# Patient Record
Sex: FEMALE | Race: WHITE | HISPANIC OR LATINO | Employment: FULL TIME | URBAN - METROPOLITAN AREA
[De-identification: names, ages, dates, MRNs, and addresses within clinical notes are randomized per-mention and may not be internally consistent; named-entity substitution may affect disease eponyms.]

---

## 2017-01-09 ENCOUNTER — ALLSCRIPTS OFFICE VISIT (OUTPATIENT)
Dept: OTHER | Facility: OTHER | Age: 42
End: 2017-01-09

## 2017-03-06 ENCOUNTER — ALLSCRIPTS OFFICE VISIT (OUTPATIENT)
Dept: OTHER | Facility: OTHER | Age: 42
End: 2017-03-06

## 2017-08-28 ENCOUNTER — ALLSCRIPTS OFFICE VISIT (OUTPATIENT)
Dept: OTHER | Facility: OTHER | Age: 42
End: 2017-08-28

## 2018-01-10 PROCEDURE — G0145 SCR C/V CYTO,THINLAYER,RESCR: HCPCS | Performed by: OBSTETRICS & GYNECOLOGY

## 2018-01-10 PROCEDURE — 87624 HPV HI-RISK TYP POOLED RSLT: CPT | Performed by: OBSTETRICS & GYNECOLOGY

## 2018-01-10 NOTE — RESULT NOTES
Message   1  No active pulmonary disease  2   No evidence of rib fractures  Verified Results  * XR RIBS LEFT W PA CHEST MIN 3 VIEWS [id number] 02:31PM [place] Willem Order Number: [id number]     Test Name Result Flag Reference   XR RIBS LEFT W PA CHEST MIN 3 VIEWS (Report)     LEFT RIBS AND CHEST -DUAL ENERGY     INDICATION: Left mid anterior rib pain, patient fell 1 week ago  COMPARISON: 10/8/2004     VIEWS: PA chest (including soft tissue/bone algorithms) and 3 views left hemithorax; 6 images     FINDINGS:     The cardiomediastinal silhouette is unremarkable  Lungs are clear  No pleural effusions  There is no pneumothorax  No rib fractures are identified  IMPRESSION:     1  No active pulmonary disease  2  No evidence of rib fractures         Workstation performed: [id number]     Signed by:   Cesario Nick DO   2/16/16

## 2018-01-11 ENCOUNTER — LAB REQUISITION (OUTPATIENT)
Dept: LAB | Facility: HOSPITAL | Age: 43
End: 2018-01-11
Payer: COMMERCIAL

## 2018-01-11 DIAGNOSIS — Z11.51 ENCOUNTER FOR SCREENING FOR HUMAN PAPILLOMAVIRUS (HPV): ICD-10-CM

## 2018-01-11 DIAGNOSIS — Z12.72 ENCOUNTER FOR SCREENING FOR MALIGNANT NEOPLASM OF VAGINA: ICD-10-CM

## 2018-01-12 VITALS
SYSTOLIC BLOOD PRESSURE: 124 MMHG | HEIGHT: 68 IN | WEIGHT: 177.13 LBS | BODY MASS INDEX: 26.84 KG/M2 | DIASTOLIC BLOOD PRESSURE: 82 MMHG

## 2018-01-12 VITALS
RESPIRATION RATE: 14 BRPM | BODY MASS INDEX: 26.36 KG/M2 | SYSTOLIC BLOOD PRESSURE: 116 MMHG | WEIGHT: 178 LBS | HEART RATE: 80 BPM | HEIGHT: 69 IN | DIASTOLIC BLOOD PRESSURE: 74 MMHG

## 2018-01-13 NOTE — RESULT NOTES
Message   all labs wnl  Verified Results  (1) CBC/PLT/DIFF [id number] 09:15AM Lucio Marie Order Number: [id number]     Test Name Result Flag Reference   WBC COUNT 6 37 Thousand/uL  4 31-10 16   RBC COUNT 4 56 Million/uL  3 81-5 12   HEMOGLOBIN 14 1 g/dL  11 5-15 4   HEMATOCRIT 40 5 %  34 8-46  1   MCV 89 fL  82-98   MCH 30 9 pg  26 8-34 3   MCHC 34 8 g/dL  31 4-37 4   RDW 13 5 %  11 6-15 1   MPV 10 4 fL  8 9-12 7   PLATELET COUNT 486 Thousands/uL  149-390   nRBC AUTOMATED 0 /100 WBCs     NEUTROPHILS RELATIVE PERCENT 62 %  43-75   LYMPHOCYTES RELATIVE PERCENT 28 %  14-44   MONOCYTES RELATIVE PERCENT 7 %  4-12   EOSINOPHILS RELATIVE PERCENT 2 %  0-6   BASOPHILS RELATIVE PERCENT 1 %  0-1   NEUTROPHILS ABSOLUTE COUNT 3 96 Thousands/?L  1 85-7 62   LYMPHOCYTES ABSOLUTE COUNT 1 79 Thousands/?L  0 60-4 47   MONOCYTES ABSOLUTE COUNT 0 43 Thousand/?L  0 17-1 22   EOSINOPHILS ABSOLUTE COUNT 0 12 Thousand/?L  0 00-0 61   BASOPHILS ABSOLUTE COUNT 0 05 Thousands/?L  0 00-0 10   - Patient Instructions: This bloodwork is non-fasting  Please drink two glasses of water morning of bloodwork  - Patient Instructions: This bloodwork is non-fasting  Please drink two glasses of water morning of bloodwork  (1) COMPREHENSIVE METABOLIC PANEL [id number] 79:70KC Acosta MAO Order Number: [id number]     Test Name Result Flag Reference   GLUCOSE,RANDM 86 mg/dL     If the patient is fasting, the ADA then defines impaired fasting glucose as > 100 mg/dL and diabetes as > or equal to 123 mg/dL     SODIUM 140 mmol/L  136-145   POTASSIUM 3 9 mmol/L  3 5-5 3   CHLORIDE 107 mmol/L  100-108   CARBON DIOXIDE 26 mmol/L  21-32   ANION GAP (CALC) 7 mmol/L  4-13   BLOOD UREA NITROGEN 8 mg/dL  5-25   CREATININE 0 74 mg/dL  0 60-1 30   Standardized to IDMS reference method   CALCIUM 8 4 mg/dL  8 3-10 1   BILI, TOTAL 0 62 mg/dL  0 20-1 00   ALK PHOSPHATAS 68 U/L     ALT (SGPT) 16 U/L  12-78 AST(SGOT) 7 U/L  5-45   ALBUMIN 3 9 g/dL  3 5-5 0   TOTAL PROTEIN 7 2 g/dL  6 4-8 2   eGFR Non-African American      >60 0 ml/min/1 73sq m   - Patient Instructions: This bloodwork is non-fasting  Please drink two glasses of water morning of bloodwork  National Kidney Disease Education Program recommendations are as follows:  GFR calculation is accurate only with a steady state creatinine  Chronic Kidney disease less than 60 ml/min/1 73 sq  meters  Kidney failure less than 15 ml/min/1 73 sq  meters  (1) T4, FREE [id number] 09:15AM Francisca Green   TW Order Number: [id number]     Test Name Result Flag Reference   T4,FREE 0 99 ng/dL  0 76-1 46   - Patient Instructions: This bloodwork is non-fasting  Please drink two glasses of water morning of bloodwork  (1) TSH [id number] 09:15AM Francisca Green   TW Order Number: [id number]     Test Name Result Flag Reference   TSH 2 310 uIU/mL  0 358-3 740   - Patient Instructions: This bloodwork is non-fasting  Please drink two glasses of water morning of bloodwork  - Patient Instructions: This bloodwork is non-fasting  Please drink two glasses of water morning of bloodwork  Patients undergoing fluorescein dye angiography may retain small amounts of fluorescein in the body for 48-72 hours post procedure  Samples containing fluorescein can produce falsely depressed TSH values  If the patient had this procedure,a specimen should be resubmitted post fluorescein clearance  The recommended reference ranges for TSH during pregnancy are as follows:  First trimester 0 1 to 2 5 uIU/mL  Second trimester  0 2 to 3 0 uIU/mL  Third trimester 0 3 to 3 0 uIU/m     (1) VITAMIN B12 [id number] 09:15AM Francisca Green   TW Order Number: [id number]     Test Name Result Flag Reference   VITAMIN B12 604 pg/mL  100-900   - Patient Instructions: This bloodwork is non-fasting  Please drink two glasses of water morning of bloodwork       (1) FOLATE [id number] 09:15AM [facility] Order Number: [id number]     Test Name Result Flag Reference   FOLATE 10 1 ng/mL  3 1-17 5   - Patient Instructions: This bloodwork is non-fasting  Please drink two glasses of water morning of bloodwork  (1) FERRITIN [id number] 09:15AM [facility] Order Number: [id number]     Test Name Result Flag Reference   FERRITIN 27 ng/mL  8-388   - Patient Instructions: This bloodwork is non-fasting  Please drink two glasses of water morning of bloodwork  (1) IRON [id number] 09:15AM [facility] Order Number: [id number]     Test Name Result Flag Reference   IRON 136 ug/dL     Patients treated with metal-binding drugs (ie  Deferoxamine) may have depressed iron values  - Patient Instructions: This bloodwork is non-fasting  Please drink two glasses of water morning of bloodwork

## 2018-01-14 VITALS
DIASTOLIC BLOOD PRESSURE: 70 MMHG | BODY MASS INDEX: 25.96 KG/M2 | WEIGHT: 175.25 LBS | SYSTOLIC BLOOD PRESSURE: 100 MMHG | HEIGHT: 69 IN

## 2018-01-15 LAB
HPV RRNA GENITAL QL NAA+PROBE: NORMAL
LAB AP GYN PRIMARY INTERPRETATION: NORMAL
Lab: NORMAL

## 2018-01-17 NOTE — RESULT NOTES
Message   low vitamin D, rec  vitamin D3 3000 IU daily  Recheck vitamin D 25-OH yearly  Verified Results  (1) COMPREHENSIVE METABOLIC PANEL [id number] 72:62KA Duane Bihari    Order Number: [id number]      National Kidney Disease Education Program recommendations are as follows:  GFR calculation is accurate only with a steady state creatinine  Chronic Kidney disease less than 60 ml/min/1 73 sq  meters  Kidney failure less than 15 ml/min/1 73 sq  meters  Test Name Result Flag Reference   GLUCOSE,RANDM 84 mg/dL     If the patient is fasting, the ADA then defines impaired fasting glucose as > 100 mg/dL and diabetes as > or equal to 123 mg/dL  SODIUM 139 mmol/L  136-145   POTASSIUM 4 3 mmol/L  3 5-5 3   CHLORIDE 106 mmol/L  100-108   CARBON DIOXIDE 28 mmol/L  21-32   ANION GAP (CALC) 5 mmol/L  4-13   BLOOD UREA NITROGEN 9 mg/dL  5-25   CREATININE 0 81 mg/dL  0 60-1 30   Standardized to IDMS reference method   CALCIUM 8 5 mg/dL  8 3-10 1   BILI, TOTAL 0 61 mg/dL  0 20-1 00   ALK PHOSPHATAS 84 U/L     ALT (SGPT) 15 U/L  12-78   AST(SGOT) 9 U/L  5-45   ALBUMIN 3 7 g/dL  3 5-5 0   TOTAL PROTEIN 6 8 g/dL  6 4-8 2   eGFR Non-African American      >60 0 ml/min/1 73sq m     (1) CBC/PLT/DIFF [id number] 08:25AM Duanereyna Hajializa    Order Number: [id number]     Order Number: [id number]     Test Name Result Flag Reference   WBC COUNT 6 86 Thousand/uL  4 31-10 16   RBC COUNT 4 85 Million/uL  3 81-5 12   HEMOGLOBIN 14 8 g/dL  11 5-15 4   HEMATOCRIT 43 0 %  34 8-46  1   MCV 89 fL  82-98   MCH 30 5 pg  26 8-34 3   MCHC 34 4 g/dL  31 4-37 4   RDW 13 5 %  11 6-15 1   MPV 10 9 fL  8 9-12 7   PLATELET COUNT 394 Thousands/uL  149-390   nRBC AUTOMATED 0 /100 WBCs     NEUTROPHILS RELATIVE PERCENT 55 %  43-75   LYMPHOCYTES RELATIVE PERCENT 34 %  14-44   MONOCYTES RELATIVE PERCENT 8 %  4-12   EOSINOPHILS RELATIVE PERCENT 2 %  0-6   BASOPHILS RELATIVE PERCENT 1 %  0-1   NEUTROPHILS ABSOLUTE COUNT 3 79 Thousands/µL  1 85-7 62   LYMPHOCYTES ABSOLUTE COUNT 2 31 Thousands/µL  0 60-4 47   MONOCYTES ABSOLUTE COUNT 0 56 Thousand/µL  0 17-1 22   EOSINOPHILS ABSOLUTE COUNT 0 15 Thousand/µL  0 00-0 61   BASOPHILS ABSOLUTE COUNT 0 04 Thousands/µL  0 00-0 10     (1) HEMOGLOBIN A1C [id number] 08:25WINTER Kei Henley   TW Order Number: [id number]      5 7-6 4% impaired fasting glucose  >=6 5% diagnosis of diabetes    Falsely low levels are seen in conditions linked to short RBC life span-  hemolytic anemia, and splenomegaly  Falsely elevated levels are seen in situations where there is an increased production of RBC- receipt of erythropoietin or blood transfusions  Adopted from ADA-Clinical Practice Recommendations     Test Name Result Flag Reference   HEMOGLOBIN A1C 4 9 %  4 0-5 6   EST  AVG  GLUCOSE 94 mg/dl       (1) LIPID PANEL FASTING W DIRECT LDL REFLEX [id number] 08:25WINTER Kei Henley   TW Order Number: [id number]      Triglyceride:         Normal              <150 mg/dl       Borderline High    150-199 mg/dl       High               200-499 mg/dl       Very High          >499 mg/dl  Cholesterol:         Desirable        <200 mg/dl      Borderline High  200-239 mg/dl      High             >239 mg/dl  HDL Cholesterol:        High    >59 mg/dL      Low     <41 mg/dL  LDL Cholesterol:        Optimal          <100 mg/dl         Near Optimal     100-129 mg/dl        Above Optimal          Borderline High   130-159 mg/dl          High              160-189 mg/dl          Very High        >189 mg/dl  LDL CALCULATED:    This screening LDL is a calculated result  It does not have the accuracy of the Direct Measured LDL in the monitoring of patients with hyperlipidemia and/or statin therapy  Direct Measure LDL (STH081) must be ordered separately in these patients       Test Name Result Flag Reference   CHOLESTEROL 147 mg/dL     LDL CHOLESTEROL CALCULATED 65 mg/dL  0-100   TRIGLYCERIDES 68 mg/dL  <=150   Specimen collection should occur prior to N-Acetylcysteine or Metamizole administration due to the potential for falsely depressed results  HDL,DIRECT 68 mg/dL H 40-60     (1) T4, FREE [id number] 08:25AM Scaffoldmie Cami   TW Order Number: [id number]     Test Name Result Flag Reference   T4,FREE 0 81 ng/dL  0 76-1 46     (1) TSH [id number] 08:25AM [facility]   TW Order Number: [id number]    Patients undergoing fluorescein dye angiography may retain small amounts of fluorescein in the body for 48-72 hours post procedure  Samples containing fluorescein can produce falsely depressed TSH values  If the patient had this procedure,a specimen should be resubmitted post fluorescein clearance          The recommended reference ranges for TSH during pregnancy are as follows:  First trimester 0 1 to 2 5 uIU/mL  Second trimester  0 2 to 3 0 uIU/mL  Third trimester 0 3 to 3 0 uIU/m     Test Name Result Flag Reference   TSH 2 550 uIU/mL  0 358-3 740     (1) VITAMIN D 25-HYDROXY [id number] 08:25AM [facility]   TW Order Number: [id number]    TW Order Number: [id number]     Test Name Result Flag Reference   VIT D 25-HYDROX 23 4 ng/mL L 30 0-100 0

## 2018-02-11 DIAGNOSIS — K21.9 GASTROESOPHAGEAL REFLUX DISEASE WITHOUT ESOPHAGITIS: Primary | ICD-10-CM

## 2018-02-14 RX ORDER — OMEPRAZOLE 20 MG/1
CAPSULE, DELAYED RELEASE ORAL
Qty: 30 CAPSULE | Refills: 0 | Status: SHIPPED | OUTPATIENT
Start: 2018-02-14 | End: 2018-04-04 | Stop reason: SDUPTHER

## 2018-04-04 DIAGNOSIS — K21.9 GASTROESOPHAGEAL REFLUX DISEASE WITHOUT ESOPHAGITIS: ICD-10-CM

## 2018-04-04 RX ORDER — OMEPRAZOLE 20 MG/1
CAPSULE, DELAYED RELEASE ORAL
Qty: 30 CAPSULE | Refills: 0 | Status: SHIPPED | OUTPATIENT
Start: 2018-04-04 | End: 2018-05-01 | Stop reason: SDUPTHER

## 2018-05-01 DIAGNOSIS — K21.9 GASTROESOPHAGEAL REFLUX DISEASE WITHOUT ESOPHAGITIS: ICD-10-CM

## 2018-05-01 RX ORDER — OMEPRAZOLE 20 MG/1
CAPSULE, DELAYED RELEASE ORAL
Qty: 30 CAPSULE | Refills: 0 | Status: SHIPPED | OUTPATIENT
Start: 2018-05-01 | End: 2018-06-08 | Stop reason: SDUPTHER

## 2018-06-08 DIAGNOSIS — K21.9 GASTROESOPHAGEAL REFLUX DISEASE WITHOUT ESOPHAGITIS: ICD-10-CM

## 2018-06-08 RX ORDER — OMEPRAZOLE 20 MG/1
CAPSULE, DELAYED RELEASE ORAL
Qty: 30 CAPSULE | Refills: 0 | Status: SHIPPED | OUTPATIENT
Start: 2018-06-08 | End: 2018-07-07 | Stop reason: SDUPTHER

## 2018-06-29 RX ORDER — IBUPROFEN 600 MG/1
600 TABLET ORAL EVERY 6 HOURS
COMMUNITY
Start: 2017-03-06

## 2018-07-07 DIAGNOSIS — K21.9 GASTROESOPHAGEAL REFLUX DISEASE WITHOUT ESOPHAGITIS: ICD-10-CM

## 2018-07-08 RX ORDER — OMEPRAZOLE 20 MG/1
CAPSULE, DELAYED RELEASE ORAL
Qty: 30 CAPSULE | Refills: 0 | Status: SHIPPED | OUTPATIENT
Start: 2018-07-08 | End: 2018-08-20 | Stop reason: SDUPTHER

## 2018-07-09 NOTE — TELEPHONE ENCOUNTER
Notify patient needs general PE, just refilled gerd medication and needs General PE for this year if not done in last year

## 2018-07-31 ENCOUNTER — OFFICE VISIT (OUTPATIENT)
Dept: FAMILY MEDICINE CLINIC | Facility: CLINIC | Age: 43
End: 2018-07-31
Payer: COMMERCIAL

## 2018-07-31 VITALS
SYSTOLIC BLOOD PRESSURE: 124 MMHG | WEIGHT: 179.2 LBS | BODY MASS INDEX: 27.16 KG/M2 | HEIGHT: 68 IN | DIASTOLIC BLOOD PRESSURE: 86 MMHG

## 2018-07-31 DIAGNOSIS — R19.7 DIARRHEA, UNSPECIFIED TYPE: ICD-10-CM

## 2018-07-31 DIAGNOSIS — K21.9 GASTROESOPHAGEAL REFLUX DISEASE, ESOPHAGITIS PRESENCE NOT SPECIFIED: ICD-10-CM

## 2018-07-31 DIAGNOSIS — Z00.00 HEALTH CARE MAINTENANCE: ICD-10-CM

## 2018-07-31 DIAGNOSIS — Z11.4 SCREENING FOR HIV (HUMAN IMMUNODEFICIENCY VIRUS): Primary | ICD-10-CM

## 2018-07-31 DIAGNOSIS — Z72.0 TOBACCO ABUSE: ICD-10-CM

## 2018-07-31 DIAGNOSIS — K59.00 CONSTIPATION, UNSPECIFIED CONSTIPATION TYPE: ICD-10-CM

## 2018-07-31 DIAGNOSIS — K58.2 IRRITABLE BOWEL SYNDROME WITH BOTH CONSTIPATION AND DIARRHEA: ICD-10-CM

## 2018-07-31 PROCEDURE — 99396 PREV VISIT EST AGE 40-64: CPT | Performed by: FAMILY MEDICINE

## 2018-07-31 RX ORDER — NICOTINE 21 MG/24HR
1 PATCH, TRANSDERMAL 24 HOURS TRANSDERMAL EVERY 24 HOURS
Qty: 28 PATCH | Refills: 0 | Status: SHIPPED | OUTPATIENT
Start: 2018-07-31 | End: 2018-09-26 | Stop reason: SDUPTHER

## 2018-07-31 RX ORDER — DICYCLOMINE HYDROCHLORIDE 10 MG/1
10 CAPSULE ORAL
Qty: 30 CAPSULE | Refills: 0 | Status: SHIPPED | OUTPATIENT
Start: 2018-07-31 | End: 2018-08-20 | Stop reason: SDUPTHER

## 2018-07-31 NOTE — PATIENT INSTRUCTIONS
Here for general PE and rec  Checking labs and trial of bentyl prn IBS with constipation and diarrhea and consult GI for gerd and constipation/diarrhea and IBS complaints  F-up with GYN for routine GYN care and mammograms  Quit tobacco with Nicoderm CQ 21/14/7 regimen 6 weeks/2 weeks/2 weeks  Call for 14 mg patch when done with 21 mg patch

## 2018-07-31 NOTE — PROGRESS NOTES
Assessment/Plan:  Chief Complaint   Patient presents with    Physical Exam    Constipation     getting worse  When really bad will use a laxative, but then recurs  Patient Instructions   Here for general PE and rec  Checking labs and trial of bentyl prn IBS with constipation and diarrhea and consult GI for gerd and constipation/diarrhea and IBS complaints  F-up with GYN for routine GYN care and mammograms  Quit tobacco with Nicoderm CQ 21/14/7 regimen 6 weeks/2 weeks/2 weeks  Call for 14 mg patch when done with 21 mg patch  No problem-specific Assessment & Plan notes found for this encounter  Diagnoses and all orders for this visit:    Screening for HIV (human immunodeficiency virus)  -     HIV 1/2 AG-AB combo; Future    Health care maintenance  -     Lipid Panel with Direct LDL reflex; Future    Diarrhea, unspecified type  -     Comprehensive metabolic panel; Future  -     CBC and differential; Future  -     TSH, 3rd generation; Future  -     T4, free    Constipation, unspecified constipation type  -     Comprehensive metabolic panel; Future  -     CBC and differential; Future  -     TSH, 3rd generation; Future  -     T4, free    Irritable bowel syndrome with both constipation and diarrhea  -     dicyclomine (BENTYL) 10 mg capsule; Take 1 capsule (10 mg total) by mouth 4 (four) times a day (before meals and at bedtime)  -     Comprehensive metabolic panel; Future  -     CBC and differential; Future    Gastroesophageal reflux disease, esophagitis presence not specified  -     Comprehensive metabolic panel; Future  -     CBC and differential; Future    Tobacco abuse  -     nicotine (NICODERM CQ) 21 mg/24 hr TD 24 hr patch; Place 1 patch on the skin every 24 hours          Subjective:      Patient ID: Noris Aparicio is a 44 y o  female  Hx of constipation and gerd and here for general PE  Never had a colon screening   Has tried stool softeners and prune juice and has had some diarrhea at times  No cp or sob, or ha  Has stress and anxiety which may be contributing to abdominal complaints  Has possible lactose intolerance as per patient  She does see her GYN and gets mammograms as directed  Constipation   Associated symptoms include diarrhea  The following portions of the patient's history were reviewed and updated as appropriate: allergies, current medications, past family history, past medical history, past social history, past surgical history and problem list     Review of Systems   Constitutional: Negative  HENT: Negative  Eyes: Negative  Respiratory: Negative  Cardiovascular: Negative  Gastrointestinal: Positive for constipation and diarrhea  Gerd stable     Endocrine: Negative  Genitourinary: Negative  Musculoskeletal: Negative  Skin: Negative  Allergic/Immunologic: Negative  Neurological: Negative  Hematological: Negative  Psychiatric/Behavioral: Negative  Stress         Objective:      /86   Ht 5' 7 5" (1 715 m)   Wt 81 3 kg (179 lb 3 2 oz)   BMI 27 65 kg/m²          Physical Exam   Constitutional: She is oriented to person, place, and time  She appears well-developed and well-nourished  HENT:   Head: Normocephalic and atraumatic  Right Ear: External ear normal    Left Ear: External ear normal    Nose: Nose normal    Mouth/Throat: Oropharynx is clear and moist    Eyes: Conjunctivae and EOM are normal  Pupils are equal, round, and reactive to light  Neck: Normal range of motion  Neck supple  Cardiovascular: Normal rate, regular rhythm, normal heart sounds and intact distal pulses  Pulmonary/Chest: Effort normal and breath sounds normal    Abdominal: Soft  Bowel sounds are normal    Musculoskeletal: Normal range of motion  Neurological: She is alert and oriented to person, place, and time  She has normal reflexes  Skin: Skin is warm and dry  Psychiatric: She has a normal mood and affect   Her behavior is normal    stressed

## 2018-08-10 ENCOUNTER — APPOINTMENT (OUTPATIENT)
Dept: LAB | Facility: CLINIC | Age: 43
End: 2018-08-10
Payer: COMMERCIAL

## 2018-08-10 DIAGNOSIS — R19.7 DIARRHEA, UNSPECIFIED TYPE: ICD-10-CM

## 2018-08-10 DIAGNOSIS — Z11.4 SCREENING FOR HIV (HUMAN IMMUNODEFICIENCY VIRUS): ICD-10-CM

## 2018-08-10 DIAGNOSIS — K59.00 CONSTIPATION, UNSPECIFIED CONSTIPATION TYPE: ICD-10-CM

## 2018-08-10 DIAGNOSIS — K58.2 IRRITABLE BOWEL SYNDROME WITH BOTH CONSTIPATION AND DIARRHEA: ICD-10-CM

## 2018-08-10 DIAGNOSIS — K21.9 GASTROESOPHAGEAL REFLUX DISEASE, ESOPHAGITIS PRESENCE NOT SPECIFIED: ICD-10-CM

## 2018-08-10 DIAGNOSIS — Z00.00 HEALTH CARE MAINTENANCE: ICD-10-CM

## 2018-08-10 LAB
ALBUMIN SERPL BCP-MCNC: 3.7 G/DL (ref 3.5–5)
ALP SERPL-CCNC: 68 U/L (ref 46–116)
ALT SERPL W P-5'-P-CCNC: 13 U/L (ref 12–78)
ANION GAP SERPL CALCULATED.3IONS-SCNC: 7 MMOL/L (ref 4–13)
AST SERPL W P-5'-P-CCNC: 8 U/L (ref 5–45)
BASOPHILS # BLD AUTO: 0.05 THOUSANDS/ΜL (ref 0–0.1)
BASOPHILS NFR BLD AUTO: 1 % (ref 0–1)
BILIRUB SERPL-MCNC: 0.79 MG/DL (ref 0.2–1)
BUN SERPL-MCNC: 7 MG/DL (ref 5–25)
CALCIUM SERPL-MCNC: 8.5 MG/DL (ref 8.3–10.1)
CHLORIDE SERPL-SCNC: 105 MMOL/L (ref 100–108)
CHOLEST SERPL-MCNC: 135 MG/DL (ref 50–200)
CO2 SERPL-SCNC: 28 MMOL/L (ref 21–32)
CREAT SERPL-MCNC: 0.75 MG/DL (ref 0.6–1.3)
EOSINOPHIL # BLD AUTO: 0.11 THOUSAND/ΜL (ref 0–0.61)
EOSINOPHIL NFR BLD AUTO: 2 % (ref 0–6)
ERYTHROCYTE [DISTWIDTH] IN BLOOD BY AUTOMATED COUNT: 13.2 % (ref 11.6–15.1)
GFR SERPL CREATININE-BSD FRML MDRD: 99 ML/MIN/1.73SQ M
GLUCOSE P FAST SERPL-MCNC: 78 MG/DL (ref 65–99)
HCT VFR BLD AUTO: 44.4 % (ref 34.8–46.1)
HDLC SERPL-MCNC: 67 MG/DL (ref 40–60)
HGB BLD-MCNC: 14.7 G/DL (ref 11.5–15.4)
IMM GRANULOCYTES # BLD AUTO: 0.02 THOUSAND/UL (ref 0–0.2)
IMM GRANULOCYTES NFR BLD AUTO: 0 % (ref 0–2)
LDLC SERPL CALC-MCNC: 53 MG/DL (ref 0–100)
LYMPHOCYTES # BLD AUTO: 2.26 THOUSANDS/ΜL (ref 0.6–4.47)
LYMPHOCYTES NFR BLD AUTO: 35 % (ref 14–44)
MCH RBC QN AUTO: 31.2 PG (ref 26.8–34.3)
MCHC RBC AUTO-ENTMCNC: 33.1 G/DL (ref 31.4–37.4)
MCV RBC AUTO: 94 FL (ref 82–98)
MONOCYTES # BLD AUTO: 0.47 THOUSAND/ΜL (ref 0.17–1.22)
MONOCYTES NFR BLD AUTO: 7 % (ref 4–12)
NEUTROPHILS # BLD AUTO: 3.52 THOUSANDS/ΜL (ref 1.85–7.62)
NEUTS SEG NFR BLD AUTO: 55 % (ref 43–75)
NRBC BLD AUTO-RTO: 0 /100 WBCS
PLATELET # BLD AUTO: 217 THOUSANDS/UL (ref 149–390)
PMV BLD AUTO: 11.2 FL (ref 8.9–12.7)
POTASSIUM SERPL-SCNC: 4.1 MMOL/L (ref 3.5–5.3)
PROT SERPL-MCNC: 7 G/DL (ref 6.4–8.2)
RBC # BLD AUTO: 4.71 MILLION/UL (ref 3.81–5.12)
SODIUM SERPL-SCNC: 140 MMOL/L (ref 136–145)
T4 FREE SERPL-MCNC: 0.79 NG/DL (ref 0.76–1.46)
TRIGL SERPL-MCNC: 76 MG/DL
TSH SERPL DL<=0.05 MIU/L-ACNC: 2.11 UIU/ML (ref 0.36–3.74)
WBC # BLD AUTO: 6.43 THOUSAND/UL (ref 4.31–10.16)

## 2018-08-10 PROCEDURE — 36415 COLL VENOUS BLD VENIPUNCTURE: CPT

## 2018-08-10 PROCEDURE — 80061 LIPID PANEL: CPT

## 2018-08-10 PROCEDURE — 84439 ASSAY OF FREE THYROXINE: CPT | Performed by: FAMILY MEDICINE

## 2018-08-10 PROCEDURE — 80053 COMPREHEN METABOLIC PANEL: CPT

## 2018-08-10 PROCEDURE — 84443 ASSAY THYROID STIM HORMONE: CPT

## 2018-08-10 PROCEDURE — 87389 HIV-1 AG W/HIV-1&-2 AB AG IA: CPT

## 2018-08-10 PROCEDURE — 85025 COMPLETE CBC W/AUTO DIFF WBC: CPT

## 2018-08-13 LAB — HIV 1+2 AB+HIV1 P24 AG SERPL QL IA: NORMAL

## 2018-08-20 DIAGNOSIS — K58.2 IRRITABLE BOWEL SYNDROME WITH BOTH CONSTIPATION AND DIARRHEA: ICD-10-CM

## 2018-08-20 DIAGNOSIS — K21.9 GASTROESOPHAGEAL REFLUX DISEASE WITHOUT ESOPHAGITIS: ICD-10-CM

## 2018-08-21 RX ORDER — OMEPRAZOLE 20 MG/1
CAPSULE, DELAYED RELEASE ORAL
Qty: 30 CAPSULE | Refills: 0 | Status: SHIPPED | OUTPATIENT
Start: 2018-08-21 | End: 2018-09-24 | Stop reason: SDUPTHER

## 2018-08-21 RX ORDER — DICYCLOMINE HYDROCHLORIDE 10 MG/1
CAPSULE ORAL
Qty: 30 CAPSULE | Refills: 0 | Status: SHIPPED | OUTPATIENT
Start: 2018-08-21 | End: 2021-04-19

## 2018-09-24 DIAGNOSIS — K21.9 GASTROESOPHAGEAL REFLUX DISEASE WITHOUT ESOPHAGITIS: ICD-10-CM

## 2018-09-24 RX ORDER — OMEPRAZOLE 20 MG/1
CAPSULE, DELAYED RELEASE ORAL
Qty: 30 CAPSULE | Refills: 0 | Status: SHIPPED | OUTPATIENT
Start: 2018-09-24 | End: 2018-10-23 | Stop reason: SDUPTHER

## 2018-09-26 DIAGNOSIS — Z72.0 TOBACCO ABUSE: ICD-10-CM

## 2018-09-27 RX ORDER — NICOTINE 21 MG/24HR
PATCH, TRANSDERMAL 24 HOURS TRANSDERMAL
Qty: 28 PATCH | Refills: 0 | Status: SHIPPED | OUTPATIENT
Start: 2018-09-27 | End: 2018-11-08 | Stop reason: SDUPTHER

## 2018-10-23 DIAGNOSIS — K21.9 GASTROESOPHAGEAL REFLUX DISEASE WITHOUT ESOPHAGITIS: ICD-10-CM

## 2018-10-23 RX ORDER — OMEPRAZOLE 20 MG/1
CAPSULE, DELAYED RELEASE ORAL
Qty: 30 CAPSULE | Refills: 0 | Status: SHIPPED | OUTPATIENT
Start: 2018-10-23 | End: 2018-12-15 | Stop reason: SDUPTHER

## 2018-11-08 DIAGNOSIS — Z72.0 TOBACCO ABUSE: ICD-10-CM

## 2018-11-08 RX ORDER — NICOTINE 21 MG/24HR
PATCH, TRANSDERMAL 24 HOURS TRANSDERMAL
Qty: 28 PATCH | Refills: 0 | Status: SHIPPED | OUTPATIENT
Start: 2018-11-08 | End: 2019-01-11 | Stop reason: SDUPTHER

## 2018-12-15 DIAGNOSIS — K21.9 GASTROESOPHAGEAL REFLUX DISEASE WITHOUT ESOPHAGITIS: ICD-10-CM

## 2018-12-16 RX ORDER — OMEPRAZOLE 20 MG/1
CAPSULE, DELAYED RELEASE ORAL
Qty: 30 CAPSULE | Refills: 0 | Status: SHIPPED | OUTPATIENT
Start: 2018-12-16 | End: 2019-02-27 | Stop reason: SDUPTHER

## 2019-01-11 DIAGNOSIS — Z72.0 TOBACCO ABUSE: ICD-10-CM

## 2019-01-11 RX ORDER — NICOTINE 21 MG/24HR
PATCH, TRANSDERMAL 24 HOURS TRANSDERMAL
Qty: 28 PATCH | Refills: 0 | Status: SHIPPED | OUTPATIENT
Start: 2019-01-11 | End: 2019-02-27 | Stop reason: SDUPTHER

## 2019-02-06 ENCOUNTER — ANNUAL EXAM (OUTPATIENT)
Dept: GYNECOLOGY | Facility: CLINIC | Age: 44
End: 2019-02-06
Payer: COMMERCIAL

## 2019-02-06 VITALS
HEIGHT: 69 IN | RESPIRATION RATE: 17 BRPM | WEIGHT: 186.6 LBS | HEART RATE: 82 BPM | DIASTOLIC BLOOD PRESSURE: 74 MMHG | BODY MASS INDEX: 27.64 KG/M2 | SYSTOLIC BLOOD PRESSURE: 110 MMHG

## 2019-02-06 DIAGNOSIS — Z01.419 WOMEN'S ANNUAL ROUTINE GYNECOLOGICAL EXAMINATION: Primary | ICD-10-CM

## 2019-02-06 DIAGNOSIS — Z12.31 ENCOUNTER FOR SCREENING MAMMOGRAM FOR MALIGNANT NEOPLASM OF BREAST: ICD-10-CM

## 2019-02-06 PROCEDURE — S0612 ANNUAL GYNECOLOGICAL EXAMINA: HCPCS | Performed by: OBSTETRICS & GYNECOLOGY

## 2019-02-06 RX ORDER — IBUPROFEN 600 MG/1
600 TABLET ORAL EVERY 6 HOURS PRN
COMMUNITY
Start: 2017-03-06

## 2019-02-07 NOTE — PROGRESS NOTES
Assessment/Plan:       Diagnoses and all orders for this visit:    Women's annual routine gynecological examination    Encounter for screening mammogram for malignant neoplasm of breast  -     Mammo screening bilateral w 3d & cad; Future    Other orders  -     ibuprofen (MOTRIN) 600 mg tablet; Take 600 mg by mouth every 6 (six) hours as needed          Subjective:      Patient ID: Candelario Calderón is a 44 y o  female  The patient is a 27-year-old who presents for an annual gyn exam   She states her periods are regular with no intermenstrual bleeding  She denies any breast or bladder problems  She has no vaginal dryness or dyspareunia  The patient reports that she has recently stopped smoking  She still using the Nicoderm patches  The following portions of the patient's history were reviewed and updated as appropriate: allergies, current medications, past family history, past medical history, past social history, past surgical history and problem list     Review of Systems   Constitutional: Negative  Respiratory: Negative for shortness of breath  Cardiovascular: Negative for chest pain  Gastrointestinal: Negative  Genitourinary: Negative  Skin: Negative  Psychiatric/Behavioral: Negative for agitation, behavioral problems and confusion  Objective:      /74 (BP Location: Right arm, Patient Position: Sitting, Cuff Size: Large)   Pulse 82   Resp 17   Ht 5' 9" (1 753 m)   Wt 84 6 kg (186 lb 9 6 oz)   LMP 01/26/2019 (Exact Date)   BMI 27 56 kg/m²          Physical Exam   Constitutional: She appears well-developed and well-nourished  No distress  HENT:   Head: Normocephalic  Pulmonary/Chest: Effort normal  No respiratory distress  Right breast exhibits no inverted nipple, no mass, no nipple discharge, no skin change and no tenderness  Left breast exhibits no inverted nipple, no mass, no nipple discharge, no skin change and no tenderness   Breasts are symmetrical  Abdominal: She exhibits no distension and no mass  There is no tenderness  There is no rebound and no guarding  Genitourinary: Rectum normal and uterus normal  No labial fusion  There is no rash, tenderness, lesion or injury on the right labia  There is no rash, tenderness, lesion or injury on the left labia  Uterus is not tender  Cervix exhibits no motion tenderness, no discharge and no friability  Right adnexum displays no mass, no tenderness and no fullness  Left adnexum displays no mass, no tenderness and no fullness  No erythema, tenderness or bleeding in the vagina  No foreign body in the vagina  No signs of injury around the vagina  No vaginal discharge found  Lymphadenopathy:        Right axillary: No pectoral and no lateral adenopathy present  Left axillary: No pectoral and no lateral adenopathy present  Skin: Skin is warm, dry and intact  She is not diaphoretic  No cyanosis  Nails show no clubbing  Psychiatric: She has a normal mood and affect   Her speech is normal and behavior is normal

## 2019-02-27 DIAGNOSIS — K21.9 GASTROESOPHAGEAL REFLUX DISEASE WITHOUT ESOPHAGITIS: ICD-10-CM

## 2019-02-27 DIAGNOSIS — Z72.0 TOBACCO ABUSE: ICD-10-CM

## 2019-02-27 RX ORDER — OMEPRAZOLE 20 MG/1
CAPSULE, DELAYED RELEASE ORAL
Qty: 30 CAPSULE | Refills: 0 | Status: SHIPPED | OUTPATIENT
Start: 2019-02-27 | End: 2019-04-30 | Stop reason: SDUPTHER

## 2019-02-27 RX ORDER — NICOTINE 21 MG/24HR
PATCH, TRANSDERMAL 24 HOURS TRANSDERMAL
Qty: 28 PATCH | Refills: 0 | Status: SHIPPED | OUTPATIENT
Start: 2019-02-27 | End: 2019-04-30 | Stop reason: SDUPTHER

## 2019-04-30 DIAGNOSIS — K21.9 GASTROESOPHAGEAL REFLUX DISEASE WITHOUT ESOPHAGITIS: ICD-10-CM

## 2019-04-30 DIAGNOSIS — Z72.0 TOBACCO ABUSE: ICD-10-CM

## 2019-04-30 RX ORDER — OMEPRAZOLE 20 MG/1
CAPSULE, DELAYED RELEASE ORAL
Qty: 30 CAPSULE | Refills: 0 | Status: SHIPPED | OUTPATIENT
Start: 2019-04-30 | End: 2019-06-08 | Stop reason: SDUPTHER

## 2019-04-30 RX ORDER — NICOTINE 21 MG/24HR
PATCH, TRANSDERMAL 24 HOURS TRANSDERMAL
Qty: 28 PATCH | Refills: 0 | Status: SHIPPED | OUTPATIENT
Start: 2019-04-30

## 2019-06-08 DIAGNOSIS — K21.9 GASTROESOPHAGEAL REFLUX DISEASE WITHOUT ESOPHAGITIS: ICD-10-CM

## 2019-06-10 RX ORDER — OMEPRAZOLE 20 MG/1
CAPSULE, DELAYED RELEASE ORAL
Qty: 30 CAPSULE | Refills: 0 | Status: SHIPPED | OUTPATIENT
Start: 2019-06-10 | End: 2019-07-09 | Stop reason: SDUPTHER

## 2019-07-09 DIAGNOSIS — K21.9 GASTROESOPHAGEAL REFLUX DISEASE WITHOUT ESOPHAGITIS: ICD-10-CM

## 2019-07-09 RX ORDER — OMEPRAZOLE 20 MG/1
CAPSULE, DELAYED RELEASE ORAL
Qty: 30 CAPSULE | Refills: 0 | Status: SHIPPED | OUTPATIENT
Start: 2019-07-09 | End: 2019-08-14 | Stop reason: SDUPTHER

## 2019-08-14 DIAGNOSIS — K21.9 GASTROESOPHAGEAL REFLUX DISEASE WITHOUT ESOPHAGITIS: ICD-10-CM

## 2019-08-14 RX ORDER — OMEPRAZOLE 20 MG/1
CAPSULE, DELAYED RELEASE ORAL
Qty: 30 CAPSULE | Refills: 0 | Status: SHIPPED | OUTPATIENT
Start: 2019-08-14 | End: 2019-12-01 | Stop reason: SDUPTHER

## 2019-08-16 ENCOUNTER — OFFICE VISIT (OUTPATIENT)
Dept: FAMILY MEDICINE CLINIC | Facility: CLINIC | Age: 44
End: 2019-08-16
Payer: COMMERCIAL

## 2019-08-16 VITALS
HEART RATE: 87 BPM | OXYGEN SATURATION: 98 % | SYSTOLIC BLOOD PRESSURE: 124 MMHG | HEIGHT: 68 IN | WEIGHT: 185.2 LBS | BODY MASS INDEX: 28.07 KG/M2 | DIASTOLIC BLOOD PRESSURE: 80 MMHG | TEMPERATURE: 97.5 F

## 2019-08-16 DIAGNOSIS — R19.7 DIARRHEA, UNSPECIFIED TYPE: Primary | ICD-10-CM

## 2019-08-16 DIAGNOSIS — Z00.00 HEALTH CARE MAINTENANCE: ICD-10-CM

## 2019-08-16 DIAGNOSIS — K21.9 GASTROESOPHAGEAL REFLUX DISEASE, ESOPHAGITIS PRESENCE NOT SPECIFIED: ICD-10-CM

## 2019-08-16 DIAGNOSIS — K59.00 CONSTIPATION, UNSPECIFIED CONSTIPATION TYPE: ICD-10-CM

## 2019-08-16 DIAGNOSIS — Z12.39 SCREENING FOR BREAST CANCER: ICD-10-CM

## 2019-08-16 DIAGNOSIS — Z13.220 SCREENING FOR HYPERLIPIDEMIA: ICD-10-CM

## 2019-08-16 DIAGNOSIS — R19.7 DIARRHEA, UNSPECIFIED TYPE: ICD-10-CM

## 2019-08-16 DIAGNOSIS — E66.3 OVERWEIGHT (BMI 25.0-29.9): ICD-10-CM

## 2019-08-16 DIAGNOSIS — K58.9 IRRITABLE BOWEL SYNDROME, UNSPECIFIED TYPE: ICD-10-CM

## 2019-08-16 DIAGNOSIS — K58.9 IRRITABLE BOWEL SYNDROME, UNSPECIFIED TYPE: Primary | ICD-10-CM

## 2019-08-16 PROCEDURE — 99396 PREV VISIT EST AGE 40-64: CPT | Performed by: FAMILY MEDICINE

## 2019-08-16 RX ORDER — DICYCLOMINE HCL 20 MG
20 TABLET ORAL EVERY 6 HOURS
Qty: 30 TABLET | Refills: 1 | Status: SHIPPED | OUTPATIENT
Start: 2019-08-16 | End: 2019-10-02 | Stop reason: SDUPTHER

## 2019-08-16 NOTE — PATIENT INSTRUCTIONS
Here for general PE and has had some IBS and diarrhea and constipation issues and will consult GI as directed  Lose weight as directed  Check labs as directed  Warm compress to abdomen prn IBS complaints, trial of bentyl 20 mg 1 every 6 hours prn IBS complaints  Take Trygve Senia med as directed may use omeprazole 20 mg 1 po BID prn gerd  F-up with GYN care for routine office visit and also get mammograms yearly as directed

## 2019-08-16 NOTE — PROGRESS NOTES
BMI Counseling: Body mass index is 27 95 kg/m²  Discussed the patient's BMI with her  The BMI is above average  BMI counseling and education was provided to the patient  Nutrition recommendations include reducing portion sizes, decreasing overall calorie intake, 3-5 servings of fruits/vegetables daily, reducing fast food intake and reducing intake of cholesterol  Exercise recommendations include exercising 3-5 times per week

## 2019-08-16 NOTE — PROGRESS NOTES
Assessment/Plan:  Chief Complaint   Patient presents with    Physical Exam     Here for yearly physical exam, everything she eats makes her sick - either constipated or diarrhea   adacel     Discuss updating Adacel     Patient Instructions   Here for general PE and has had some IBS and diarrhea and constipation issues and will consult GI as directed  Lose weight as directed  Check labs as directed  Warm compress to abdomen prn IBS complaints, trial of bentyl 20 mg 1 every 6 hours prn IBS complaints  Take Ford Dolly med as directed may use omeprazole 20 mg 1 po BID prn gerd  F-up with GYN care for routine office visit and also get mammograms yearly as directed  No problem-specific Assessment & Plan notes found for this encounter  Diagnoses and all orders for this visit:    Irritable bowel syndrome, unspecified type  -     dicyclomine (BENTYL) 20 mg tablet; Take 1 tablet (20 mg total) by mouth every 6 (six) hours    Health care maintenance  -     Comprehensive metabolic panel; Future  -     Lipid Panel with Direct LDL reflex; Future  -     TSH, 3rd generation; Future  -     T4, free  -     CBC and differential; Future    Diarrhea, unspecified type    Constipation, unspecified constipation type  -     TSH, 3rd generation; Future  -     T4, free  -     CBC and differential; Future    Gastroesophageal reflux disease, esophagitis presence not specified    Overweight (BMI 25 0-29  9)    Screening for hyperlipidemia  -     Comprehensive metabolic panel; Future  -     Lipid Panel with Direct LDL reflex; Future    Screening for breast cancer  -     Mammo screening bilateral w 3d & cad; Future          Subjective:      Patient ID: Valeri Ellis is a 38 y o  female  Physical Exam (Here for yearly physical exam, everything she eats makes her sick - either constipated or diarrhea )  adacel (Discuss updating Adacel)    Has had GI problems  Pt  Is always constipated  May have IBS  Has had diarrhea at times also  The following portions of the patient's history were reviewed and updated as appropriate: allergies, current medications, past family history, past medical history, past social history, past surgical history and problem list     Review of Systems   Constitutional: Negative  HENT: Negative  Eyes: Negative  Respiratory: Negative  Cardiovascular: Negative  Gastrointestinal: Positive for constipation and diarrhea  Endocrine: Negative  Genitourinary: Negative  Gerd   Musculoskeletal: Negative  Skin: Negative  Allergic/Immunologic: Negative  Neurological: Negative  Hematological: Negative  Psychiatric/Behavioral: Negative  Objective:      /80   Pulse 87   Temp 97 5 °F (36 4 °C)   Ht 5' 8 25" (1 734 m)   Wt 84 kg (185 lb 3 2 oz)   SpO2 98%   BMI 27 95 kg/m²          Physical Exam   Constitutional: She is oriented to person, place, and time  She appears well-developed and well-nourished  HENT:   Head: Normocephalic and atraumatic  Right Ear: External ear normal    Left Ear: External ear normal    Nose: Nose normal    Mouth/Throat: Oropharynx is clear and moist    Eyes: Pupils are equal, round, and reactive to light  Conjunctivae and EOM are normal    Neck: Normal range of motion  Neck supple  Cardiovascular: Normal rate, regular rhythm, normal heart sounds and intact distal pulses  Pulmonary/Chest: Effort normal and breath sounds normal    Abdominal: Soft  Bowel sounds are normal    Musculoskeletal: Normal range of motion  Neurological: She is alert and oriented to person, place, and time  She has normal reflexes  Skin: Skin is warm and dry  Psychiatric: She has a normal mood and affect   Her behavior is normal

## 2019-08-30 ENCOUNTER — APPOINTMENT (OUTPATIENT)
Dept: LAB | Facility: CLINIC | Age: 44
End: 2019-08-30
Payer: COMMERCIAL

## 2019-08-30 DIAGNOSIS — K59.00 CONSTIPATION, UNSPECIFIED CONSTIPATION TYPE: ICD-10-CM

## 2019-08-30 DIAGNOSIS — Z13.220 SCREENING FOR HYPERLIPIDEMIA: ICD-10-CM

## 2019-08-30 DIAGNOSIS — Z00.00 HEALTH CARE MAINTENANCE: ICD-10-CM

## 2019-08-30 LAB
ALBUMIN SERPL BCP-MCNC: 4.4 G/DL (ref 3.5–5)
ALP SERPL-CCNC: 66 U/L (ref 46–116)
ALT SERPL W P-5'-P-CCNC: 16 U/L (ref 12–78)
ANION GAP SERPL CALCULATED.3IONS-SCNC: 6 MMOL/L (ref 4–13)
AST SERPL W P-5'-P-CCNC: 9 U/L (ref 5–45)
BASOPHILS # BLD AUTO: 0.08 THOUSANDS/ΜL (ref 0–0.1)
BASOPHILS NFR BLD AUTO: 1 % (ref 0–1)
BILIRUB SERPL-MCNC: 0.55 MG/DL (ref 0.2–1)
BUN SERPL-MCNC: 10 MG/DL (ref 5–25)
CALCIUM SERPL-MCNC: 9.3 MG/DL (ref 8.3–10.1)
CHLORIDE SERPL-SCNC: 109 MMOL/L (ref 100–108)
CHOLEST SERPL-MCNC: 145 MG/DL (ref 50–200)
CO2 SERPL-SCNC: 26 MMOL/L (ref 21–32)
CREAT SERPL-MCNC: 0.76 MG/DL (ref 0.6–1.3)
EOSINOPHIL # BLD AUTO: 0.09 THOUSAND/ΜL (ref 0–0.61)
EOSINOPHIL NFR BLD AUTO: 1 % (ref 0–6)
ERYTHROCYTE [DISTWIDTH] IN BLOOD BY AUTOMATED COUNT: 13.2 % (ref 11.6–15.1)
GFR SERPL CREATININE-BSD FRML MDRD: 96 ML/MIN/1.73SQ M
GLUCOSE P FAST SERPL-MCNC: 83 MG/DL (ref 65–99)
HCT VFR BLD AUTO: 43.3 % (ref 34.8–46.1)
HDLC SERPL-MCNC: 61 MG/DL (ref 40–60)
HGB BLD-MCNC: 14.5 G/DL (ref 11.5–15.4)
IMM GRANULOCYTES # BLD AUTO: 0.03 THOUSAND/UL (ref 0–0.2)
IMM GRANULOCYTES NFR BLD AUTO: 0 % (ref 0–2)
LDLC SERPL CALC-MCNC: 75 MG/DL (ref 0–100)
LYMPHOCYTES # BLD AUTO: 1.91 THOUSANDS/ΜL (ref 0.6–4.47)
LYMPHOCYTES NFR BLD AUTO: 23 % (ref 14–44)
MCH RBC QN AUTO: 31.5 PG (ref 26.8–34.3)
MCHC RBC AUTO-ENTMCNC: 33.5 G/DL (ref 31.4–37.4)
MCV RBC AUTO: 94 FL (ref 82–98)
MONOCYTES # BLD AUTO: 0.63 THOUSAND/ΜL (ref 0.17–1.22)
MONOCYTES NFR BLD AUTO: 8 % (ref 4–12)
NEUTROPHILS # BLD AUTO: 5.53 THOUSANDS/ΜL (ref 1.85–7.62)
NEUTS SEG NFR BLD AUTO: 67 % (ref 43–75)
NRBC BLD AUTO-RTO: 0 /100 WBCS
PLATELET # BLD AUTO: 246 THOUSANDS/UL (ref 149–390)
PMV BLD AUTO: 10.9 FL (ref 8.9–12.7)
POTASSIUM SERPL-SCNC: 4 MMOL/L (ref 3.5–5.3)
PROT SERPL-MCNC: 7.3 G/DL (ref 6.4–8.2)
RBC # BLD AUTO: 4.6 MILLION/UL (ref 3.81–5.12)
SODIUM SERPL-SCNC: 141 MMOL/L (ref 136–145)
T4 FREE SERPL-MCNC: 0.83 NG/DL (ref 0.76–1.46)
TRIGL SERPL-MCNC: 46 MG/DL
TSH SERPL DL<=0.05 MIU/L-ACNC: 1.42 UIU/ML (ref 0.36–3.74)
WBC # BLD AUTO: 8.27 THOUSAND/UL (ref 4.31–10.16)

## 2019-08-30 PROCEDURE — 36415 COLL VENOUS BLD VENIPUNCTURE: CPT

## 2019-08-30 PROCEDURE — 80061 LIPID PANEL: CPT

## 2019-08-30 PROCEDURE — 85025 COMPLETE CBC W/AUTO DIFF WBC: CPT

## 2019-08-30 PROCEDURE — 80053 COMPREHEN METABOLIC PANEL: CPT

## 2019-08-30 PROCEDURE — 84439 ASSAY OF FREE THYROXINE: CPT | Performed by: FAMILY MEDICINE

## 2019-08-30 PROCEDURE — 84443 ASSAY THYROID STIM HORMONE: CPT

## 2019-10-02 DIAGNOSIS — K58.9 IRRITABLE BOWEL SYNDROME, UNSPECIFIED TYPE: ICD-10-CM

## 2019-10-02 RX ORDER — DICYCLOMINE HCL 20 MG
TABLET ORAL
Qty: 30 TABLET | Refills: 1 | Status: SHIPPED | OUTPATIENT
Start: 2019-10-02 | End: 2019-12-01 | Stop reason: SDUPTHER

## 2019-12-01 DIAGNOSIS — K21.9 GASTROESOPHAGEAL REFLUX DISEASE WITHOUT ESOPHAGITIS: ICD-10-CM

## 2019-12-01 DIAGNOSIS — K58.9 IRRITABLE BOWEL SYNDROME, UNSPECIFIED TYPE: ICD-10-CM

## 2019-12-02 RX ORDER — OMEPRAZOLE 20 MG/1
CAPSULE, DELAYED RELEASE ORAL
Qty: 30 CAPSULE | Refills: 0 | Status: SHIPPED | OUTPATIENT
Start: 2019-12-02 | End: 2019-12-31

## 2019-12-02 RX ORDER — DICYCLOMINE HCL 20 MG
TABLET ORAL
Qty: 30 TABLET | Refills: 0 | Status: SHIPPED | OUTPATIENT
Start: 2019-12-02 | End: 2019-12-31

## 2019-12-30 ENCOUNTER — TELEPHONE (OUTPATIENT)
Dept: GYNECOLOGY | Facility: CLINIC | Age: 44
End: 2019-12-30

## 2019-12-30 DIAGNOSIS — K21.9 GASTROESOPHAGEAL REFLUX DISEASE WITHOUT ESOPHAGITIS: ICD-10-CM

## 2019-12-30 DIAGNOSIS — K58.9 IRRITABLE BOWEL SYNDROME, UNSPECIFIED TYPE: ICD-10-CM

## 2019-12-30 NOTE — TELEPHONE ENCOUNTER
Called patient, ILZ,  and let her know Dr Sumeet Mckay appointment is cancelled  Told her to call office to make appointment

## 2019-12-31 RX ORDER — OMEPRAZOLE 20 MG/1
CAPSULE, DELAYED RELEASE ORAL
Qty: 30 CAPSULE | Refills: 0 | Status: SHIPPED | OUTPATIENT
Start: 2019-12-31 | End: 2020-01-31

## 2019-12-31 RX ORDER — DICYCLOMINE HCL 20 MG
TABLET ORAL
Qty: 30 TABLET | Refills: 0 | Status: SHIPPED | OUTPATIENT
Start: 2019-12-31 | End: 2020-01-31

## 2020-01-30 DIAGNOSIS — K21.9 GASTROESOPHAGEAL REFLUX DISEASE WITHOUT ESOPHAGITIS: ICD-10-CM

## 2020-01-30 DIAGNOSIS — K58.9 IRRITABLE BOWEL SYNDROME, UNSPECIFIED TYPE: ICD-10-CM

## 2020-01-31 RX ORDER — DICYCLOMINE HCL 20 MG
TABLET ORAL
Qty: 30 TABLET | Refills: 0 | Status: SHIPPED | OUTPATIENT
Start: 2020-01-31 | End: 2020-12-23

## 2020-01-31 RX ORDER — OMEPRAZOLE 20 MG/1
CAPSULE, DELAYED RELEASE ORAL
Qty: 30 CAPSULE | Refills: 0 | Status: SHIPPED | OUTPATIENT
Start: 2020-01-31 | End: 2020-04-27

## 2020-04-25 DIAGNOSIS — K21.9 GASTROESOPHAGEAL REFLUX DISEASE WITHOUT ESOPHAGITIS: ICD-10-CM

## 2020-04-27 RX ORDER — OMEPRAZOLE 20 MG/1
CAPSULE, DELAYED RELEASE ORAL
Qty: 30 CAPSULE | Refills: 0 | Status: SHIPPED | OUTPATIENT
Start: 2020-04-27 | End: 2020-06-09

## 2020-06-09 DIAGNOSIS — K21.9 GASTROESOPHAGEAL REFLUX DISEASE WITHOUT ESOPHAGITIS: ICD-10-CM

## 2020-06-09 RX ORDER — OMEPRAZOLE 20 MG/1
CAPSULE, DELAYED RELEASE ORAL
Qty: 30 CAPSULE | Refills: 0 | Status: SHIPPED | OUTPATIENT
Start: 2020-06-09 | End: 2020-07-16

## 2020-07-16 DIAGNOSIS — K21.9 GASTROESOPHAGEAL REFLUX DISEASE WITHOUT ESOPHAGITIS: ICD-10-CM

## 2020-07-16 RX ORDER — OMEPRAZOLE 20 MG/1
CAPSULE, DELAYED RELEASE ORAL
Qty: 30 CAPSULE | Refills: 0 | Status: SHIPPED | OUTPATIENT
Start: 2020-07-16 | End: 2020-08-17

## 2020-08-17 DIAGNOSIS — K21.9 GASTROESOPHAGEAL REFLUX DISEASE WITHOUT ESOPHAGITIS: ICD-10-CM

## 2020-08-17 RX ORDER — OMEPRAZOLE 20 MG/1
CAPSULE, DELAYED RELEASE ORAL
Qty: 30 CAPSULE | Refills: 0 | Status: SHIPPED | OUTPATIENT
Start: 2020-08-17 | End: 2020-09-21

## 2020-08-31 ENCOUNTER — OFFICE VISIT (OUTPATIENT)
Dept: FAMILY MEDICINE CLINIC | Facility: CLINIC | Age: 45
End: 2020-08-31
Payer: COMMERCIAL

## 2020-08-31 VITALS
HEART RATE: 85 BPM | WEIGHT: 196.2 LBS | HEIGHT: 69 IN | OXYGEN SATURATION: 98 % | TEMPERATURE: 98.6 F | RESPIRATION RATE: 16 BRPM | SYSTOLIC BLOOD PRESSURE: 120 MMHG | BODY MASS INDEX: 29.06 KG/M2 | DIASTOLIC BLOOD PRESSURE: 84 MMHG

## 2020-08-31 DIAGNOSIS — E66.3 OVERWEIGHT: ICD-10-CM

## 2020-08-31 DIAGNOSIS — Z00.00 HEALTH CARE MAINTENANCE: Primary | ICD-10-CM

## 2020-08-31 DIAGNOSIS — Z72.89 ALCOHOL USE: ICD-10-CM

## 2020-08-31 DIAGNOSIS — Z13.220 SCREENING FOR HYPERLIPIDEMIA: ICD-10-CM

## 2020-08-31 DIAGNOSIS — Z72.0 TOBACCO ABUSE: ICD-10-CM

## 2020-08-31 DIAGNOSIS — K21.9 GASTROESOPHAGEAL REFLUX DISEASE, ESOPHAGITIS PRESENCE NOT SPECIFIED: ICD-10-CM

## 2020-08-31 DIAGNOSIS — R10.9 ABDOMINAL PAIN, UNSPECIFIED ABDOMINAL LOCATION: ICD-10-CM

## 2020-08-31 PROCEDURE — 99396 PREV VISIT EST AGE 40-64: CPT | Performed by: FAMILY MEDICINE

## 2020-08-31 PROCEDURE — 3725F SCREEN DEPRESSION PERFORMED: CPT | Performed by: FAMILY MEDICINE

## 2020-08-31 PROCEDURE — 3008F BODY MASS INDEX DOCD: CPT | Performed by: FAMILY MEDICINE

## 2020-08-31 NOTE — PROGRESS NOTES
Assessment/Plan:  Chief Complaint   Patient presents with    Physical Exam    Abdominal Pain     upper     Patient Instructions   Here for General PE and needs gyn and mammogram as directed  Decrease alcohol and exercise and lose weight  Eat healthy  Gained 11 pounds likely from increased alcohol consumption over the pandemic  Last drank yesterday 3 glasses of wine  Consult GI for hx of gerd and now b/l upper upper quadrant abdominal pain  after alcohol consumption 3-4 drinks per day since March 2020  Quit tobacco smokes 1PPD currently and smoked for last 20 years  Establish new GYN for routine GYN care  No problem-specific Assessment & Plan notes found for this encounter  Diagnoses and all orders for this visit:    Health care maintenance    Overweight    Abdominal pain, unspecified abdominal location  -     Comprehensive metabolic panel; Future  -     CBC and differential; Future  -     Amylase; Future  -     Lipase; Future  -     UA w Reflex to Microscopic w Reflex to Culture -Lab Collect    Alcohol use  -     Comprehensive metabolic panel; Future  -     CBC and differential; Future  -     Amylase; Future  -     Lipase; Future    Gastroesophageal reflux disease, esophagitis presence not specified  -     CBC and differential; Future    Screening for hyperlipidemia  -     Lipid Panel with Direct LDL reflex; Future    Tobacco abuse          Subjective:      Patient ID: Steve Borges is a 41 y o  female  Physical Exam   Abdominal Pain (upper)    Does drink alcohol and gained weight due to boredom and increased during pandemic  Was drinking 3-4 drinks per day since last march 2020  The following portions of the patient's history were reviewed and updated as appropriate: allergies, current medications, past family history, past medical history, past social history, past surgical history and problem list     Review of Systems   Constitutional: Negative  Overweight   HENT: Negative  Eyes: Negative  Respiratory: Negative  Cardiovascular: Negative  Gastrointestinal: Positive for abdominal pain (b/l upper quadrants  )  Endocrine: Negative  Genitourinary: Negative  Musculoskeletal: Negative  Skin: Negative  Allergic/Immunologic: Negative  Neurological: Negative  Hematological: Negative  Psychiatric/Behavioral: Negative  Objective:      /84 (BP Location: Left arm, Patient Position: Sitting, Cuff Size: Standard)   Pulse 85   Temp 98 6 °F (37 °C) (Temporal)   Resp 16   Ht 5' 8 5" (1 74 m)   Wt 89 kg (196 lb 3 2 oz)   SpO2 98%   BMI 29 40 kg/m²          Physical Exam  Constitutional:       Appearance: She is well-developed  Comments: overweight   HENT:      Head: Normocephalic and atraumatic  Right Ear: External ear normal       Left Ear: External ear normal       Nose: Nose normal    Eyes:      Conjunctiva/sclera: Conjunctivae normal       Pupils: Pupils are equal, round, and reactive to light  Neck:      Musculoskeletal: Normal range of motion and neck supple  Cardiovascular:      Rate and Rhythm: Normal rate and regular rhythm  Heart sounds: Normal heart sounds  Pulmonary:      Effort: Pulmonary effort is normal       Breath sounds: Normal breath sounds  Abdominal:      General: Abdomen is flat  Bowel sounds are normal       Palpations: Abdomen is soft  Musculoskeletal: Normal range of motion  Skin:     General: Skin is warm and dry  Neurological:      Mental Status: She is alert and oriented to person, place, and time  Deep Tendon Reflexes: Reflexes are normal and symmetric     Psychiatric:         Behavior: Behavior normal

## 2020-08-31 NOTE — PATIENT INSTRUCTIONS
Here for General PE and needs gyn and mammogram as directed  Decrease alcohol and exercise and lose weight  Eat healthy  Gained 11 pounds likely from increased alcohol consumption over the pandemic  Last drank yesterday 3 glasses of wine  Consult GI for hx of gerd and now b/l upper upper quadrant abdominal pain  after alcohol consumption 3-4 drinks per day since March 2020  Quit tobacco smokes 1PPD currently and smoked for last 20 years  Establish new GYN for routine GYN care

## 2020-09-01 ENCOUNTER — APPOINTMENT (OUTPATIENT)
Dept: LAB | Facility: CLINIC | Age: 45
End: 2020-09-01
Payer: COMMERCIAL

## 2020-09-01 DIAGNOSIS — R10.9 ABDOMINAL PAIN, UNSPECIFIED ABDOMINAL LOCATION: ICD-10-CM

## 2020-09-01 DIAGNOSIS — Z72.89 ALCOHOL USE: ICD-10-CM

## 2020-09-01 DIAGNOSIS — Z13.220 SCREENING FOR HYPERLIPIDEMIA: ICD-10-CM

## 2020-09-01 DIAGNOSIS — K21.9 GASTROESOPHAGEAL REFLUX DISEASE, ESOPHAGITIS PRESENCE NOT SPECIFIED: ICD-10-CM

## 2020-09-01 LAB
ALBUMIN SERPL BCP-MCNC: 3.9 G/DL (ref 3.5–5)
ALP SERPL-CCNC: 72 U/L (ref 46–116)
ALT SERPL W P-5'-P-CCNC: 33 U/L (ref 12–78)
AMYLASE SERPL-CCNC: 48 IU/L (ref 25–115)
ANION GAP SERPL CALCULATED.3IONS-SCNC: 7 MMOL/L (ref 4–13)
AST SERPL W P-5'-P-CCNC: 19 U/L (ref 5–45)
BACTERIA UR QL AUTO: ABNORMAL /HPF
BASOPHILS # BLD AUTO: 0.05 THOUSANDS/ΜL (ref 0–0.1)
BASOPHILS NFR BLD AUTO: 1 % (ref 0–1)
BILIRUB SERPL-MCNC: 1.08 MG/DL (ref 0.2–1)
BILIRUB UR QL STRIP: ABNORMAL
BUN SERPL-MCNC: 9 MG/DL (ref 5–25)
CALCIUM SERPL-MCNC: 9 MG/DL (ref 8.3–10.1)
CAOX CRY URNS QL MICRO: ABNORMAL /HPF
CHLORIDE SERPL-SCNC: 109 MMOL/L (ref 100–108)
CHOLEST SERPL-MCNC: 162 MG/DL (ref 50–200)
CLARITY UR: CLEAR
CO2 SERPL-SCNC: 22 MMOL/L (ref 21–32)
COLOR UR: ABNORMAL
CREAT SERPL-MCNC: 0.71 MG/DL (ref 0.6–1.3)
EOSINOPHIL # BLD AUTO: 0.1 THOUSAND/ΜL (ref 0–0.61)
EOSINOPHIL NFR BLD AUTO: 2 % (ref 0–6)
ERYTHROCYTE [DISTWIDTH] IN BLOOD BY AUTOMATED COUNT: 12.6 % (ref 11.6–15.1)
GFR SERPL CREATININE-BSD FRML MDRD: 104 ML/MIN/1.73SQ M
GLUCOSE P FAST SERPL-MCNC: 92 MG/DL (ref 65–99)
GLUCOSE UR STRIP-MCNC: NEGATIVE MG/DL
HCT VFR BLD AUTO: 43.3 % (ref 34.8–46.1)
HDLC SERPL-MCNC: 68 MG/DL
HGB BLD-MCNC: 14.7 G/DL (ref 11.5–15.4)
HGB UR QL STRIP.AUTO: ABNORMAL
IMM GRANULOCYTES # BLD AUTO: 0.02 THOUSAND/UL (ref 0–0.2)
IMM GRANULOCYTES NFR BLD AUTO: 0 % (ref 0–2)
KETONES UR STRIP-MCNC: ABNORMAL MG/DL
LDLC SERPL CALC-MCNC: 79 MG/DL (ref 0–100)
LEUKOCYTE ESTERASE UR QL STRIP: ABNORMAL
LIPASE SERPL-CCNC: 59 U/L (ref 73–393)
LYMPHOCYTES # BLD AUTO: 2.18 THOUSANDS/ΜL (ref 0.6–4.47)
LYMPHOCYTES NFR BLD AUTO: 33 % (ref 14–44)
MCH RBC QN AUTO: 31.7 PG (ref 26.8–34.3)
MCHC RBC AUTO-ENTMCNC: 33.9 G/DL (ref 31.4–37.4)
MCV RBC AUTO: 93 FL (ref 82–98)
MONOCYTES # BLD AUTO: 0.54 THOUSAND/ΜL (ref 0.17–1.22)
MONOCYTES NFR BLD AUTO: 8 % (ref 4–12)
MUCOUS THREADS UR QL AUTO: ABNORMAL
NEUTROPHILS # BLD AUTO: 3.65 THOUSANDS/ΜL (ref 1.85–7.62)
NEUTS SEG NFR BLD AUTO: 56 % (ref 43–75)
NITRITE UR QL STRIP: NEGATIVE
NON-SQ EPI CELLS URNS QL MICRO: ABNORMAL /HPF
NRBC BLD AUTO-RTO: 0 /100 WBCS
PH UR STRIP.AUTO: 6 [PH]
PLATELET # BLD AUTO: 241 THOUSANDS/UL (ref 149–390)
PMV BLD AUTO: 11 FL (ref 8.9–12.7)
POTASSIUM SERPL-SCNC: 3.7 MMOL/L (ref 3.5–5.3)
PROT SERPL-MCNC: 7 G/DL (ref 6.4–8.2)
PROT UR STRIP-MCNC: NEGATIVE MG/DL
RBC # BLD AUTO: 4.64 MILLION/UL (ref 3.81–5.12)
RBC #/AREA URNS AUTO: ABNORMAL /HPF
SODIUM SERPL-SCNC: 138 MMOL/L (ref 136–145)
SP GR UR STRIP.AUTO: 1.02 (ref 1–1.03)
TRIGL SERPL-MCNC: 75 MG/DL
UROBILINOGEN UR QL STRIP.AUTO: 1 E.U./DL
WBC # BLD AUTO: 6.54 THOUSAND/UL (ref 4.31–10.16)
WBC #/AREA URNS AUTO: ABNORMAL /HPF

## 2020-09-01 PROCEDURE — 80053 COMPREHEN METABOLIC PANEL: CPT

## 2020-09-01 PROCEDURE — 36415 COLL VENOUS BLD VENIPUNCTURE: CPT

## 2020-09-01 PROCEDURE — 82150 ASSAY OF AMYLASE: CPT

## 2020-09-01 PROCEDURE — 83690 ASSAY OF LIPASE: CPT

## 2020-09-01 PROCEDURE — 80061 LIPID PANEL: CPT

## 2020-09-01 PROCEDURE — 81001 URINALYSIS AUTO W/SCOPE: CPT | Performed by: FAMILY MEDICINE

## 2020-09-01 PROCEDURE — 85025 COMPLETE CBC W/AUTO DIFF WBC: CPT

## 2020-09-02 DIAGNOSIS — N39.0 URINARY TRACT INFECTION WITHOUT HEMATURIA, SITE UNSPECIFIED: Primary | ICD-10-CM

## 2020-09-02 RX ORDER — CIPROFLOXACIN 500 MG/1
500 TABLET, FILM COATED ORAL EVERY 12 HOURS SCHEDULED
Qty: 10 TABLET | Refills: 0 | Status: SHIPPED | OUTPATIENT
Start: 2020-09-02 | End: 2020-09-06

## 2020-09-02 NOTE — TELEPHONE ENCOUNTER
Patient returned call and is aware of results and agreeable to beginning Cipro    Please send to [facility]

## 2020-09-02 NOTE — TELEPHONE ENCOUNTER
----- Message from Angie Maza sent at 9/2/2020  1:03 PM EDT -----  Left voicemail message for patient to return call

## 2020-09-06 DIAGNOSIS — N39.0 URINARY TRACT INFECTION WITHOUT HEMATURIA, SITE UNSPECIFIED: ICD-10-CM

## 2020-09-06 RX ORDER — CIPROFLOXACIN 500 MG/1
TABLET, FILM COATED ORAL
Qty: 10 TABLET | Refills: 0 | Status: SHIPPED | OUTPATIENT
Start: 2020-09-06 | End: 2020-09-11

## 2020-09-20 DIAGNOSIS — K21.9 GASTROESOPHAGEAL REFLUX DISEASE WITHOUT ESOPHAGITIS: ICD-10-CM

## 2020-09-21 RX ORDER — OMEPRAZOLE 20 MG/1
CAPSULE, DELAYED RELEASE ORAL
Qty: 30 CAPSULE | Refills: 0 | Status: SHIPPED | OUTPATIENT
Start: 2020-09-21 | End: 2020-10-26

## 2020-10-24 DIAGNOSIS — K21.9 GASTROESOPHAGEAL REFLUX DISEASE WITHOUT ESOPHAGITIS: ICD-10-CM

## 2020-10-26 RX ORDER — OMEPRAZOLE 20 MG/1
CAPSULE, DELAYED RELEASE ORAL
Qty: 30 CAPSULE | Refills: 0 | Status: SHIPPED | OUTPATIENT
Start: 2020-10-26 | End: 2020-11-23

## 2020-11-05 ENCOUNTER — TELEPHONE (OUTPATIENT)
Dept: GASTROENTEROLOGY | Facility: MEDICAL CENTER | Age: 45
End: 2020-11-05

## 2020-11-23 DIAGNOSIS — K21.9 GASTROESOPHAGEAL REFLUX DISEASE WITHOUT ESOPHAGITIS: ICD-10-CM

## 2020-11-23 RX ORDER — OMEPRAZOLE 20 MG/1
CAPSULE, DELAYED RELEASE ORAL
Qty: 30 CAPSULE | Refills: 0 | Status: SHIPPED | OUTPATIENT
Start: 2020-11-23 | End: 2020-12-23

## 2020-12-23 DIAGNOSIS — K21.9 GASTROESOPHAGEAL REFLUX DISEASE WITHOUT ESOPHAGITIS: ICD-10-CM

## 2020-12-23 DIAGNOSIS — K58.9 IRRITABLE BOWEL SYNDROME, UNSPECIFIED TYPE: ICD-10-CM

## 2020-12-23 RX ORDER — OMEPRAZOLE 20 MG/1
CAPSULE, DELAYED RELEASE ORAL
Qty: 30 CAPSULE | Refills: 0 | Status: SHIPPED | OUTPATIENT
Start: 2020-12-23 | End: 2021-01-07

## 2020-12-23 RX ORDER — DICYCLOMINE HCL 20 MG
TABLET ORAL
Qty: 30 TABLET | Refills: 0 | Status: SHIPPED | OUTPATIENT
Start: 2020-12-23 | End: 2021-04-19

## 2020-12-29 ENCOUNTER — TELEPHONE (OUTPATIENT)
Dept: GASTROENTEROLOGY | Facility: MEDICAL CENTER | Age: 45
End: 2020-12-29

## 2021-01-07 DIAGNOSIS — K21.9 GASTROESOPHAGEAL REFLUX DISEASE WITHOUT ESOPHAGITIS: ICD-10-CM

## 2021-01-07 RX ORDER — OMEPRAZOLE 20 MG/1
CAPSULE, DELAYED RELEASE ORAL
Qty: 30 CAPSULE | Refills: 0 | Status: SHIPPED | OUTPATIENT
Start: 2021-01-07 | End: 2021-02-08 | Stop reason: SDUPTHER

## 2021-02-08 ENCOUNTER — TELEPHONE (OUTPATIENT)
Dept: FAMILY MEDICINE CLINIC | Facility: CLINIC | Age: 46
End: 2021-02-08

## 2021-02-08 DIAGNOSIS — K21.9 GASTROESOPHAGEAL REFLUX DISEASE WITHOUT ESOPHAGITIS: ICD-10-CM

## 2021-02-08 RX ORDER — OMEPRAZOLE 20 MG/1
20 CAPSULE, DELAYED RELEASE ORAL DAILY PRN
Qty: 90 CAPSULE | Refills: 3 | Status: SHIPPED | OUTPATIENT
Start: 2021-02-08 | End: 2021-02-13

## 2021-02-08 NOTE — TELEPHONE ENCOUNTER
Patient is calling for a 90 day supply with a bigger quantity of her omeprazole  Please send to the [facility] on [address] [place]  She is currently on 30 day supply with 1 pill a day  She is only taking 2 pills a day though and running out faster than normally  Please advise  Thank you

## 2021-02-13 DIAGNOSIS — K21.9 GASTROESOPHAGEAL REFLUX DISEASE WITHOUT ESOPHAGITIS: ICD-10-CM

## 2021-02-13 RX ORDER — OMEPRAZOLE 20 MG/1
CAPSULE, DELAYED RELEASE ORAL
Qty: 30 CAPSULE | Refills: 3 | Status: SHIPPED | OUTPATIENT
Start: 2021-02-13

## 2021-03-25 ENCOUNTER — TELEPHONE (OUTPATIENT)
Dept: FAMILY MEDICINE CLINIC | Facility: CLINIC | Age: 46
End: 2021-03-25

## 2021-03-25 NOTE — TELEPHONE ENCOUNTER
Patient called requesting phone number for  [facility] [place]  Phone number given to patient

## 2021-04-19 ENCOUNTER — OFFICE VISIT (OUTPATIENT)
Dept: GASTROENTEROLOGY | Facility: CLINIC | Age: 46
End: 2021-04-19
Payer: COMMERCIAL

## 2021-04-19 VITALS
SYSTOLIC BLOOD PRESSURE: 112 MMHG | TEMPERATURE: 98.6 F | BODY MASS INDEX: 31.25 KG/M2 | DIASTOLIC BLOOD PRESSURE: 80 MMHG | HEART RATE: 76 BPM | HEIGHT: 69 IN | WEIGHT: 211 LBS

## 2021-04-19 DIAGNOSIS — R10.11 RUQ PAIN: Primary | ICD-10-CM

## 2021-04-19 DIAGNOSIS — K59.04 CHRONIC IDIOPATHIC CONSTIPATION: ICD-10-CM

## 2021-04-19 DIAGNOSIS — R10.13 DYSPEPSIA: ICD-10-CM

## 2021-04-19 PROCEDURE — 3008F BODY MASS INDEX DOCD: CPT | Performed by: INTERNAL MEDICINE

## 2021-04-19 PROCEDURE — 99244 OFF/OP CNSLTJ NEW/EST MOD 40: CPT | Performed by: INTERNAL MEDICINE

## 2021-04-19 NOTE — PATIENT INSTRUCTIONS
I would like you to do a CT scan for further evaluation to see if you have constipation on imaging study versus diarrhea  Will plan for EGD and colonoscopy evaluation for further evaluation for change in bowel habits  Will also evaluate for history of reflux disease  I would like you to take an over-the-counter probiotic pill on a daily basis  I would like you to also check blood work  Further management will be based on findings on EGD, colonoscopy, blood work and CT scan        EGD/COLON scheduled on 6/8/21 with Dr Brito at [facility] gave pt verbal instructions/paper work given  Prep-Miralax/Dulcolax  Pt will schedule Ct-scan/US

## 2021-04-21 NOTE — PROGRESS NOTES
[facility] - Outpatient Consultation  Ivone Lemus 40 y o  female MRN: [id number]  Encounter: [id number]          ASSESSMENT AND PLAN:      1  RUQ pain  - US right upper quadrant; Future  - CT abdomen pelvis w wo contrast; Future  - Colonoscopy; Future  - EGD; Future  - Celiac Disease Panel; Future  - TSH, 3rd generation with Free T4 reflex; Future  - CBC and differential; Future  - Comprehensive metabolic panel; Future    2  Chronic idiopathic constipation  3  Dyspepsia  - CT abdomen pelvis w wo contrast; Future    I would like you to do a CT scan for further evaluation to see if you have constipation on imaging study versus diarrhea  Will plan for EGD and colonoscopy evaluation for further evaluation for change in bowel habits  Will also evaluate for history of reflux disease  I would like you to take an over-the-counter probiotic pill on a daily basis  I would like you to also check blood work  Further management will be based on findings on EGD, colonoscopy, blood work and CT scan  ______________________________________________________________________    HPI:      She is a 43-year-old female with history of chronic right upper quadrant pain  She also has history of chronic constipation  She has tried multiple therapeutic options for right upper quadrant pain including dicyclomine, improvement of constipation, NSAIDs without any alleviation of symptoms  She has also tried PPI therapy with limited benefit  Symptoms are not associated with oral intake of food or bowel movements  Symptoms are vague and sometimes positional   She also experiences abdominal bloating and discomfort associated dyspepsia symptoms  Right upper quadrant pain is constant  She also reports worsening constipation over the last several months  She has a bowel movement every 4-5 days  She reports bowel movement may be liquidy consistent [place] stool type 5-6      Denies any nausea, vomiting, fevers, chills  No recent endoscopic evaluation  Denies family history of colorectal cancer  REVIEW OF SYSTEMS:    CONSTITUTIONAL: Denies any fever, chills, rigors, and weight loss  HEENT: No earache or tinnitus  Denies hearing loss or visual disturbances  CARDIOVASCULAR: No chest pain or palpitations  RESPIRATORY: Denies any cough, hemoptysis, shortness of breath or dyspnea on exertion  GASTROINTESTINAL: As noted in the History of Present Illness  GENITOURINARY: No problems with urination  Denies any hematuria or dysuria  NEUROLOGIC: No dizziness or vertigo, denies headaches  MUSCULOSKELETAL: Denies any muscle or joint pain  SKIN: Denies skin rashes or itching  ENDOCRINE: Denies excessive thirst  Denies intolerance to heat or cold  PSYCHOSOCIAL: Denies depression or anxiety  Denies any recent memory loss  Historical Information   History reviewed  No pertinent past medical history  History reviewed  No pertinent surgical history  Social History   Social History     Substance and Sexual Activity   Alcohol Use Yes    Comment: occassionally     Social History     Substance and Sexual Activity   Drug Use No     Social History     Tobacco Use   Smoking Status Current Every Day Smoker    Types: Cigarettes   Smokeless Tobacco Current User     History reviewed  No pertinent family history  Meds/Allergies       Current Outpatient Medications:     ibuprofen (MOTRIN) 600 mg tablet    omeprazole (PriLOSEC) 20 mg delayed release capsule    ibuprofen (MOTRIN) 600 mg tablet    nicotine (NICODERM CQ) 21 mg/24 hr TD 24 hr patch    No Known Allergies        Objective     Blood pressure 112/80, pulse 76, temperature 98 6 °F (37 °C), temperature source Tympanic, height 5' 8 5" (1 74 m), weight 95 7 kg (211 lb)  Body mass index is 31 62 kg/m²          PHYSICAL EXAM:      General Appearance:   Alert, cooperative, no distress   HEENT:   Normocephalic, atraumatic, anicteric      Neck:  Supple, symmetrical, trachea midline   Lungs:   Clear to auscultation bilaterally; no rales, rhonchi or wheezing; respirations unlabored    Heart[de-identified]   Regular rate and rhythm; no murmur, rub, or gallop  Abdomen:   Soft, non-tender, non-distended; normal bowel sounds; no masses, no organomegaly    Genitalia:   Deferred    Rectal:   Deferred    Extremities:  No cyanosis, clubbing or edema    Pulses:  2+ and symmetric    Skin:  No jaundice, rashes, or lesions    Lymph nodes:  No palpable cervical lymphadenopathy        Lab Results:   No visits with results within 1 Day(s) from this visit     Latest known visit with results is:   Appointment on 09/01/2020   Component Date Value    Sodium 09/01/2020 138     Potassium 09/01/2020 3 7     Chloride 09/01/2020 109*    CO2 09/01/2020 22     ANION GAP 09/01/2020 7     BUN 09/01/2020 9     Creatinine 09/01/2020 0 71     Glucose, Fasting 09/01/2020 92     Calcium 09/01/2020 9 0     AST 09/01/2020 19     ALT 09/01/2020 33     Alkaline Phosphatase 09/01/2020 72     Total Protein 09/01/2020 7 0     Albumin 09/01/2020 3 9     Total Bilirubin 09/01/2020 1 08*    eGFR 09/01/2020 104     WBC 09/01/2020 6 54     RBC 09/01/2020 4 64     Hemoglobin 09/01/2020 14 7     Hematocrit 09/01/2020 43 3     MCV 09/01/2020 93     MCH 09/01/2020 31 7     MCHC 09/01/2020 33 9     RDW 09/01/2020 12 6     MPV 09/01/2020 11 0     Platelets 30/91/8874 241     nRBC 09/01/2020 0     Neutrophils Relative 09/01/2020 56     Immat GRANS % 09/01/2020 0     Lymphocytes Relative 09/01/2020 33     Monocytes Relative 09/01/2020 8     Eosinophils Relative 09/01/2020 2     Basophils Relative 09/01/2020 1     Neutrophils Absolute 09/01/2020 3 65     Immature Grans Absolute 09/01/2020 0 02     Lymphocytes Absolute 09/01/2020 2 18     Monocytes Absolute 09/01/2020 0 54     Eosinophils Absolute 09/01/2020 0 10     Basophils Absolute 09/01/2020 0 05     Amylase 09/01/2020 48     Lipase 09/01/2020 59*  Cholesterol 09/01/2020 162     Triglycerides 09/01/2020 75     HDL, Direct 09/01/2020 68     LDL Calculated 09/01/2020 79          Radiology Results:   No results found

## 2021-04-23 ENCOUNTER — HOSPITAL ENCOUNTER (OUTPATIENT)
Dept: ULTRASOUND IMAGING | Facility: HOSPITAL | Age: 46
Discharge: HOME | End: 2021-04-23
Attending: INTERNAL MEDICINE
Payer: COMMERCIAL

## 2021-04-23 ENCOUNTER — APPOINTMENT (OUTPATIENT)
Dept: LAB | Facility: CLINIC | Age: 46
End: 2021-04-23
Payer: COMMERCIAL

## 2021-04-23 DIAGNOSIS — R10.11 RUQ PAIN: ICD-10-CM

## 2021-04-23 LAB
ALBUMIN SERPL BCP-MCNC: 4 G/DL (ref 3.5–5)
ALP SERPL-CCNC: 86 U/L (ref 46–116)
ALT SERPL W P-5'-P-CCNC: 31 U/L (ref 12–78)
ANION GAP SERPL CALCULATED.3IONS-SCNC: 7 MMOL/L (ref 4–13)
AST SERPL W P-5'-P-CCNC: 19 U/L (ref 5–45)
BASOPHILS # BLD AUTO: 0.05 THOUSANDS/ΜL (ref 0–0.1)
BASOPHILS NFR BLD AUTO: 1 % (ref 0–1)
BILIRUB SERPL-MCNC: 1.03 MG/DL (ref 0.2–1)
BUN SERPL-MCNC: 9 MG/DL (ref 5–25)
CALCIUM SERPL-MCNC: 9 MG/DL (ref 8.3–10.1)
CHLORIDE SERPL-SCNC: 107 MMOL/L (ref 100–108)
CO2 SERPL-SCNC: 25 MMOL/L (ref 21–32)
CREAT SERPL-MCNC: 0.77 MG/DL (ref 0.6–1.3)
EOSINOPHIL # BLD AUTO: 0.1 THOUSAND/ΜL (ref 0–0.61)
EOSINOPHIL NFR BLD AUTO: 2 % (ref 0–6)
ERYTHROCYTE [DISTWIDTH] IN BLOOD BY AUTOMATED COUNT: 13.3 % (ref 11.6–15.1)
GFR SERPL CREATININE-BSD FRML MDRD: 94 ML/MIN/1.73SQ M
GLUCOSE P FAST SERPL-MCNC: 71 MG/DL (ref 65–99)
HCT VFR BLD AUTO: 44.5 % (ref 34.8–46.1)
HGB BLD-MCNC: 14.8 G/DL (ref 11.5–15.4)
IMM GRANULOCYTES # BLD AUTO: 0.01 THOUSAND/UL (ref 0–0.2)
IMM GRANULOCYTES NFR BLD AUTO: 0 % (ref 0–2)
LYMPHOCYTES # BLD AUTO: 1.88 THOUSANDS/ΜL (ref 0.6–4.47)
LYMPHOCYTES NFR BLD AUTO: 34 % (ref 14–44)
MCH RBC QN AUTO: 31 PG (ref 26.8–34.3)
MCHC RBC AUTO-ENTMCNC: 33.3 G/DL (ref 31.4–37.4)
MCV RBC AUTO: 93 FL (ref 82–98)
MONOCYTES # BLD AUTO: 0.44 THOUSAND/ΜL (ref 0.17–1.22)
MONOCYTES NFR BLD AUTO: 8 % (ref 4–12)
NEUTROPHILS # BLD AUTO: 3.08 THOUSANDS/ΜL (ref 1.85–7.62)
NEUTS SEG NFR BLD AUTO: 55 % (ref 43–75)
NRBC BLD AUTO-RTO: 0 /100 WBCS
PLATELET # BLD AUTO: 268 THOUSANDS/UL (ref 149–390)
PMV BLD AUTO: 11 FL (ref 8.9–12.7)
POTASSIUM SERPL-SCNC: 3.8 MMOL/L (ref 3.5–5.3)
PROT SERPL-MCNC: 7.1 G/DL (ref 6.4–8.2)
RBC # BLD AUTO: 4.77 MILLION/UL (ref 3.81–5.12)
SODIUM SERPL-SCNC: 139 MMOL/L (ref 136–145)
TSH SERPL DL<=0.05 MIU/L-ACNC: 2.91 UIU/ML (ref 0.36–3.74)
WBC # BLD AUTO: 5.56 THOUSAND/UL (ref 4.31–10.16)

## 2021-04-23 PROCEDURE — 76705 ECHO EXAM OF ABDOMEN: CPT

## 2021-04-23 PROCEDURE — 85025 COMPLETE CBC W/AUTO DIFF WBC: CPT

## 2021-04-23 PROCEDURE — 84443 ASSAY THYROID STIM HORMONE: CPT

## 2021-04-23 PROCEDURE — 83516 IMMUNOASSAY NONANTIBODY: CPT

## 2021-04-23 PROCEDURE — 86255 FLUORESCENT ANTIBODY SCREEN: CPT

## 2021-04-23 PROCEDURE — 82784 ASSAY IGA/IGD/IGG/IGM EACH: CPT

## 2021-04-23 PROCEDURE — 80053 COMPREHEN METABOLIC PANEL: CPT

## 2021-04-23 PROCEDURE — 36415 COLL VENOUS BLD VENIPUNCTURE: CPT

## 2021-04-24 LAB
ENDOMYSIUM IGA SER QL: NEGATIVE
GLIADIN PEPTIDE IGA SER-ACNC: 4 UNITS (ref 0–19)
GLIADIN PEPTIDE IGG SER-ACNC: 4 UNITS (ref 0–19)
IGA SERPL-MCNC: 90 MG/DL (ref 87–352)
TTG IGA SER-ACNC: <2 U/ML (ref 0–3)
TTG IGG SER-ACNC: 3 U/ML (ref 0–5)

## 2021-04-26 ENCOUNTER — TELEPHONE (OUTPATIENT)
Dept: GASTROENTEROLOGY | Facility: MEDICAL CENTER | Age: 46
End: 2021-04-26

## 2021-04-26 NOTE — TELEPHONE ENCOUNTER
----- Message from Jeffery Laguna MD sent at 4/23/2021  8:01 PM EDT -----  Call patient to report normal results    T bili is borderline this can be very commonly seen and I would not recommend any further lab work at this time

## 2021-05-02 ENCOUNTER — HOSPITAL ENCOUNTER (OUTPATIENT)
Dept: CT IMAGING | Facility: HOSPITAL | Age: 46
Discharge: HOME | End: 2021-05-02
Attending: INTERNAL MEDICINE
Payer: COMMERCIAL

## 2021-05-02 DIAGNOSIS — R10.11 RUQ PAIN: ICD-10-CM

## 2021-05-02 DIAGNOSIS — R10.13 DYSPEPSIA: ICD-10-CM

## 2021-05-02 PROCEDURE — 74177 CT ABD & PELVIS W/CONTRAST: CPT

## 2021-05-02 PROCEDURE — G1004 CDSM NDSC: HCPCS

## 2021-05-02 RX ADMIN — IOHEXOL 100 ML: 350 INJECTION, SOLUTION INTRAVENOUS at 12:27

## 2021-05-12 ENCOUNTER — TELEPHONE (OUTPATIENT)
Dept: FAMILY MEDICINE CLINIC | Facility: CLINIC | Age: 46
End: 2021-05-12

## 2021-05-12 ENCOUNTER — NURSE TRIAGE (OUTPATIENT)
Dept: OTHER | Facility: OTHER | Age: 46
End: 2021-05-12

## 2021-05-12 NOTE — TELEPHONE ENCOUNTER
Reason for Disposition   White of the eyes have turned yellow (i e , jaundice)    Answer Assessment - Initial Assessment Questions  1  LOCATION: "Where does it hurt?"       Normally upper right but now it is starting to move, lower right and left     2  RADIATION: "Does the pain shoot anywhere else?" (e g , chest, back)      Denies    3  ONSET: "When did the pain begin?" (e g , minutes, hours or days ago)       Has been an ongoing issue, worse within last few days     4  SUDDEN: "Gradual or sudden onset?"      Sudden     5  PATTERN "Does the pain come and go, or is it constant?"     - If constant: "Is it getting better, staying the same, or worsening?"       (Note: Constant means the pain never goes away completely; most serious pain is constant and it progresses)      - If intermittent: "How long does it last?" "Do you have pain now?"      (Note: Intermittent means the pain goes away completely between bouts)       Intermittent, a lot with movement like even putting pants on     6  SEVERITY: "How bad is the pain?"  (e g , Scale 1-10; mild, moderate, or severe)    - MILD (1-3): doesn't interfere with normal activities, abdomen soft and not tender to touch     - MODERATE (4-7): interferes with normal activities or awakens from sleep, tender to touch     - SEVERE (8-10): excruciating pain, doubled over, unable to do any normal activities       Severe pain, has to lay on ground for cramping to settle     7  RECURRENT SYMPTOM: "Have you ever had this type of abdominal pain before?" If so, ask: "When was the last time?" and "What happened that time?"       Yes, being seen at GI   Had ultrasound and bloodwork  Ultrasound showed fatty liver and some cysts    8  CAUSE: "What do you think is causing the abdominal pain?"      Possible infection    9  RELIEVING/AGGRAVATING FACTORS: "What makes it better or worse?" (e g , movement, antacids, bowel movement)      Stretch, move, bending over     10   OTHER SYMPTOMS: "Has there been any vomiting, diarrhea, constipation, or urine problems?"        Stomach swells    Protocols used: ABDOMINAL PAIN - [facility]

## 2021-05-12 NOTE — TELEPHONE ENCOUNTER
Regarding: stomach cramps   ----- Message from [facility] sent at 5/12/2021  5:11 PM EDT -----  " My stomach is swelling and cramping to the point I cant even move  "

## 2021-05-12 NOTE — TELEPHONE ENCOUNTER
PT was referred to gastro, they ran testes and could not determine anything  Patient is having symptoms of pain and cramping in stomach  in the sides, back area  She is bloated as well  She is wondering if there is something she can do or take for this  Please advise

## 2021-05-12 NOTE — TELEPHONE ENCOUNTER
Patient called in with severe abdominal pain, diarrhea, yellowing of the eyes and states she feels like this is similar to an infection she has had in the past  Spoke with on call provider and the recommendation is to go to ER for evaluation  Patient verbalized understanding

## 2021-05-13 NOTE — TELEPHONE ENCOUNTER
I spoke with patient after reviewing her chart  Patient did not follow medical advice given last night  I asked patient why she did not go to ED she said because of cost  I informed patient that there is assistance programs available that can reduce costs or billing can work with her  Patient understood  I then asked patient if she would like to schedule appointment and patient said she would call back  Per patient she is still experiencing the same symptoms, pain level is at a 7 today

## 2021-05-14 ENCOUNTER — ANESTHESIA EVENT (OUTPATIENT)
Dept: GASTROENTEROLOGY | Facility: MEDICAL CENTER | Age: 46
End: 2021-05-14

## 2021-06-07 PROBLEM — F17.200 SMOKING: Status: ACTIVE | Noted: 2021-06-07

## 2021-06-07 PROBLEM — IMO0001 SMOKING: Status: ACTIVE | Noted: 2021-06-07

## 2021-06-08 ENCOUNTER — ANESTHESIA (OUTPATIENT)
Dept: GASTROENTEROLOGY | Facility: MEDICAL CENTER | Age: 46
End: 2021-06-08

## 2021-06-08 ENCOUNTER — HOSPITAL ENCOUNTER (OUTPATIENT)
Dept: GASTROENTEROLOGY | Facility: MEDICAL CENTER | Age: 46
Setting detail: OUTPATIENT SURGERY
Discharge: HOME | End: 2021-06-08
Attending: INTERNAL MEDICINE | Admitting: INTERNAL MEDICINE
Payer: COMMERCIAL

## 2021-06-08 VITALS
RESPIRATION RATE: 20 BRPM | OXYGEN SATURATION: 99 % | DIASTOLIC BLOOD PRESSURE: 77 MMHG | BODY MASS INDEX: 31.07 KG/M2 | SYSTOLIC BLOOD PRESSURE: 115 MMHG | WEIGHT: 205 LBS | HEIGHT: 68 IN | HEART RATE: 69 BPM | TEMPERATURE: 98.7 F

## 2021-06-08 DIAGNOSIS — R10.11 RUQ PAIN: ICD-10-CM

## 2021-06-08 PROBLEM — K21.9 GASTROESOPHAGEAL REFLUX DISEASE: Status: ACTIVE | Noted: 2021-06-08

## 2021-06-08 LAB
EXT PREGNANCY TEST URINE: NEGATIVE
EXT. CONTROL: NORMAL

## 2021-06-08 PROCEDURE — 45385 COLONOSCOPY W/LESION REMOVAL: CPT | Performed by: INTERNAL MEDICINE

## 2021-06-08 PROCEDURE — 81025 URINE PREGNANCY TEST: CPT | Performed by: ANESTHESIOLOGY

## 2021-06-08 PROCEDURE — 43239 EGD BIOPSY SINGLE/MULTIPLE: CPT | Performed by: INTERNAL MEDICINE

## 2021-06-08 PROCEDURE — 88305 TISSUE EXAM BY PATHOLOGIST: CPT | Performed by: PATHOLOGY

## 2021-06-08 RX ORDER — LIDOCAINE HYDROCHLORIDE 20 MG/ML
INJECTION, SOLUTION EPIDURAL; INFILTRATION; INTRACAUDAL; PERINEURAL AS NEEDED
Status: DISCONTINUED | OUTPATIENT
Start: 2021-06-08 | End: 2021-06-08

## 2021-06-08 RX ORDER — PROPOFOL 10 MG/ML
INJECTION, EMULSION INTRAVENOUS AS NEEDED
Status: DISCONTINUED | OUTPATIENT
Start: 2021-06-08 | End: 2021-06-08

## 2021-06-08 RX ORDER — SODIUM CHLORIDE 9 MG/ML
125 INJECTION, SOLUTION INTRAVENOUS CONTINUOUS
Status: DISCONTINUED | OUTPATIENT
Start: 2021-06-08 | End: 2021-06-08

## 2021-06-08 RX ADMIN — PROPOFOL 50 MG: 10 INJECTION, EMULSION INTRAVENOUS at 14:26

## 2021-06-08 RX ADMIN — PROPOFOL 50 MG: 10 INJECTION, EMULSION INTRAVENOUS at 14:35

## 2021-06-08 RX ADMIN — SODIUM CHLORIDE 125 ML/HR: 0.9 INJECTION, SOLUTION INTRAVENOUS at 13:17

## 2021-06-08 RX ADMIN — PROPOFOL 50 MG: 10 INJECTION, EMULSION INTRAVENOUS at 14:22

## 2021-06-08 RX ADMIN — PROPOFOL 50 MG: 10 INJECTION, EMULSION INTRAVENOUS at 14:41

## 2021-06-08 RX ADMIN — PROPOFOL 100 MG: 10 INJECTION, EMULSION INTRAVENOUS at 14:21

## 2021-06-08 RX ADMIN — PROPOFOL 50 MG: 10 INJECTION, EMULSION INTRAVENOUS at 14:29

## 2021-06-08 RX ADMIN — PROPOFOL 50 MG: 10 INJECTION, EMULSION INTRAVENOUS at 14:32

## 2021-06-08 RX ADMIN — LIDOCAINE HYDROCHLORIDE 100 MG: 20 INJECTION, SOLUTION EPIDURAL; INFILTRATION; INTRACAUDAL; PERINEURAL at 14:21

## 2021-06-08 NOTE — ANESTHESIA PREPROCEDURE EVALUATION
Procedure:  COLONOSCOPY  EGD    Relevant Problems   ANESTHESIA (within normal limits)      CARDIO (within normal limits)      ENDO (within normal limits)      GI/HEPATIC   (+) Gastroesophageal reflux disease      /RENAL (within normal limits)      GYN (within normal limits)      HEMATOLOGY (within normal limits)      MUSCULOSKELETAL (within normal limits)      NEURO/PSYCH (within normal limits)      PULMONARY   (+) Smoking      Other   (+) RUQ pain        Physical Exam    Airway    Mallampati score: III  TM Distance: >3 FB  Neck ROM: full     Dental   No notable dental hx     Cardiovascular  Rhythm: regular, Rate: normal, Cardiovascular exam normal    Pulmonary  Pulmonary exam normal Breath sounds clear to auscultation,     Other Findings        Anesthesia Plan  ASA Score- 2     Anesthesia Type- IV sedation with anesthesia with ASA Monitors  Additional Monitors:   Airway Plan:           Plan Factors-Exercise tolerance (METS): >4 METS  Chart reviewed  Patient summary reviewed  Patient is a current smoker  Patient instructed to abstain from smoking on day of procedure  Patient smoked on day of surgery  Induction- intravenous  Postoperative Plan-     Informed Consent- Anesthetic plan and risks discussed with patient

## 2021-06-08 NOTE — H&P
History and Physical -  [facility]  Elvia German 40 y o  female MRN: [id number]                  HPI: Elvia German is a 40y o  year old female who presents for EGD/colonoscopy for evaluation of abdominal pain and change in bowel habits  REVIEW OF SYSTEMS: Per the HPI, and otherwise unremarkable  Historical Information   History reviewed  No pertinent past medical history  Past Surgical History:   Procedure Laterality Date    NO PAST SURGERIES       Social History   Social History     Substance and Sexual Activity   Alcohol Use Yes    Comment: occassionally     Social History     Substance and Sexual Activity   Drug Use No     Social History     Tobacco Use   Smoking Status Current Every Day Smoker    Packs/day: 1 00    Types: Cigarettes   Smokeless Tobacco Current User     History reviewed  No pertinent family history  Meds/Allergies     (Not in a hospital admission)      No Known Allergies    Objective     Blood pressure 120/79, pulse 76, temperature 98 7 °F (37 1 °C), temperature source Temporal, resp  rate 20, height 5' 8" (1 727 m), weight 93 kg (205 lb), SpO2 97 %  PHYSICAL EXAM    Gen: NAD  CV: RRR  CHEST: Clear  ABD: soft, NT/ND  EXT: no edema      ASSESSMENT/PLAN:  This is a 40y o  year old female here for EGD/colonoscopy for evaluation of abdominal pain and change in bowel habits      PLAN:   Procedure:  EGD/colonoscopy

## 2021-06-08 NOTE — DISCHARGE INSTRUCTIONS
Upper Endoscopy and Colonoscopy   WHAT YOU NEED TO KNOW:   An upper endoscopy is also called an upper gastrointestinal (GI) endoscopy, or an esophagogastroduodenoscopy (EGD)  It is a procedure to examine the inside of your esophagus, stomach, and duodenum (first part of the small intestine) with a scope  You may feel bloated, gassy, or have some abdominal discomfort after your procedure  Your throat may be sore for 24 to 36 hours  You may burp or pass gas from air that is still inside your body  A colonoscopy is a procedure to examine the inside of your colon (intestine) with a scope  Polyps or tissue growths may have been removed during your colonoscopy  It is normal to feel bloated and to have some abdominal discomfort  You should be passing gas  If you have hemorrhoids or you had polyps removed, you may have a small amount of bleeding  DISCHARGE INSTRUCTIONS:   Seek care immediately if:   · You have sudden, severe abdominal pain  · You have problems swallowing  · You have a large amount of black, sticky bowel movements or blood in your bowel movements  · You have sudden trouble breathing  · You feel weak, lightheaded, or faint or your heart beats faster than normal for you  Contact your healthcare provider if:   · You have a fever and chills  · You have nausea or are vomiting  · Your abdomen is bloated or feels full and hard  · You have abdominal pain  · You have a large amount of black, sticky bowel movements or blood in your bowel movements  · You have not had a bowel movement for 3 days after your procedure  · You have rash or hives  · You have questions or concerns about your procedure  Activity:   ·       Do not lift, strain, or run for 24 hours after your procedure  ·       Rest after your procedure  You have been given medicine to relax you  Do not drive or make important decisions until the day after your procedure   Return to your normal activity as directed  ·       Relieve gas and discomfort from bloating by lying on your right side with a heating pad on your abdomen  You may need to take short walks to help the gas move out  Eat small meals until bloating is relieved  Follow up with your healthcare provider as directed: Write down your questions so you remember to ask them during your visits  If you take a blood thinner, please review the specific instructions from your endoscopist about when you should resume it  These can be found in the Recommendation and Your Medication list sections of this After Visit Summary  Colorectal Polyps   WHAT YOU NEED TO KNOW:   What are colorectal polyps? Colorectal polyps are small growths of tissue in the lining of the colon and rectum  Most polyps are hyperplastic polyps and are usually benign (noncancerous)  Certain types of polyps, called adenomatous polyps, may turn into cancer  What increases my risk of colorectal polyps? The exact cause of colorectal polyps is unknown  The following may increase your risk:  · Older age    · A diet of foods high in fat and low in fiber     · Family history of polyps    · Intestinal diseases, such as Crohn's disease or ulcerative colitis    · An unhealthy lifestyle, such as physical inactivity, smoking, or drinking alcohol    · Obesity    What are the signs and symptoms of colorectal polyps? · Blood in your bowel movement or bleeding from the rectum    · Change in bowel movement habits, such as diarrhea and constipation    · Abdominal pain    How are colorectal polyps diagnosed? You should have fecal blood screening once a year for colorectal disease if you are over 48years old  You should be screened earlier if you have an intestinal disease or a family history of polyps or colorectal cancer  During this screening, a sample of your bowel movement is checked for blood, which may be an early sign of colorectal polyps or cancer   You may also need any of the following tests:  · Digital rectal exam:  Your healthcare provider will examine your anus and use a finger to check your rectum for polyps  · Barium enema: A barium enema is an x-ray of the colon  A tube is put into your anus, and a liquid called barium is put through the tube  Barium is used so that healthcare providers can see your colon better on the x-ray film  · Virtual colonoscopy: This is a CT scan that takes pictures of the inside of your colon and rectum  A small, flexible tube is put into your rectum and air or carbon dioxide (gas) is used to expand your colon  This lets healthcare providers clearly see your colon and any polyps on a monitor  · Colonoscopy or sigmoidoscopy: These procedures help your healthcare provider see the inside of your colon using a flexible tube with a small light and camera on the end  During a sigmoidoscopy, your healthcare provider will only look at rectum and lower colon  During a colonoscopy, healthcare providers will look at the full length of your colon  Healthcare providers may remove a small amount of tissue from the colon for a biopsy  How are colorectal polyps treated? A polypectomy is a minimally invasive procedure to remove your polyps  They may be removed during a colonoscopy or sigmoidoscopy  Your healthcare provider may need to remove the polyps with a laparoscope  Laparoscopy is done by inserting a small, flexible scope into incisions made on your abdomen  What are the risks of colorectal polyps? You may bleed during a colonoscopy procedure  Your bowel may be perforated (torn) when polyps are removed  This may lead to an open abdominal surgery  During surgery, you may bleed too much or get an infection  Adenomatous polyps that are not removed may turn into cancer and become more difficult to treat  Where can I find support and more information?    · [facility] ([facility])  [address]  [place], [place] [address]  Phone: [phone]  Web Address: Sabrina Bundy  [facility] nih gov    When should I contact my healthcare provider? · You have a fever  · You have chills, a cough, or feel weak and achy  · You have abdominal pain that does not go away or gets worse after you take medicine  · Your abdomen is swollen  · You are losing weight without trying  · You have questions or concerns about your condition or care  When should I seek immediate care or call 911? · You have sudden shortness of breath  · You have a fast heart rate, fast breathing, or are too dizzy to stand up  · You have severe abdominal pain  · You see blood in your bowel movement  CARE AGREEMENT:   You have the right to help plan your care  Learn about your health condition and how it may be treated  Discuss treatment options with your healthcare providers to decide what care you want to receive  You always have the right to refuse treatment  The above information is an  only  It is not intended as medical advice for individual conditions or treatments  Talk to your doctor, nurse or pharmacist before following any medical regimen to see if it is safe and effective for you  © Copyright [address] Information is for End User's use only and may not be sold, redistributed or otherwise used for commercial purposes   All illustrations and images included in CareNotes® are the copyrighted property of A D A M , Inc  or [address] [place] [address]

## 2021-06-08 NOTE — ANESTHESIA POSTPROCEDURE EVALUATION
Post-Op Assessment Note    CV Status:  Stable    Pain management: adequate     Mental Status:  Alert and awake   Hydration Status:  Euvolemic   PONV Controlled:  Controlled   Airway Patency:  Patent      Post Op Vitals Reviewed: Yes      Staff: Anesthesiologist         No complications documented      BP      Temp      Pulse     Resp      SpO2
12-Nov-2020

## 2021-07-09 ENCOUNTER — OFFICE VISIT (OUTPATIENT)
Dept: GASTROENTEROLOGY | Facility: CLINIC | Age: 46
End: 2021-07-09
Payer: COMMERCIAL

## 2021-07-09 VITALS
TEMPERATURE: 98.7 F | HEIGHT: 68 IN | SYSTOLIC BLOOD PRESSURE: 118 MMHG | HEART RATE: 63 BPM | WEIGHT: 205 LBS | BODY MASS INDEX: 31.07 KG/M2 | DIASTOLIC BLOOD PRESSURE: 60 MMHG

## 2021-07-09 DIAGNOSIS — K76.0 FATTY LIVER: ICD-10-CM

## 2021-07-09 DIAGNOSIS — R10.13 DYSPEPSIA: ICD-10-CM

## 2021-07-09 DIAGNOSIS — R10.11 RUQ PAIN: Primary | ICD-10-CM

## 2021-07-09 PROCEDURE — 3008F BODY MASS INDEX DOCD: CPT | Performed by: INTERNAL MEDICINE

## 2021-07-09 PROCEDURE — 99214 OFFICE O/P EST MOD 30 MIN: CPT | Performed by: INTERNAL MEDICINE

## 2021-07-09 NOTE — PROGRESS NOTES
[facility]  Progress Note - Joy Forrest 40 y o  female MRN: [id number]    Unit/Bed#:  Encounter: [id number]    Assessment/Plan:  1  RUQ pain  - NM hepatobiliary w rx; Future    2  Dyspepsia  3  Fatty liver disease    Continue probiotics   Trial of low FODMAP diet   We will plan for HIDA scan  We can consider treatment sibo      Recommend weight loss on a yearly basis of at least 8-10%  Avoidance of fatty foods and adopting healthy lifestyle  Recent LFTs within normal limits    I suspect symptoms may be secondary to musculoskeletal related pain as symptoms do get worse with sit-ups and in the same region  I recommended follow-up with Dr Marisol Mustafa and may be in consideration of pain management  I did recommend getting HIDA scan but this will likely be low yield  We can consider empiric therapy for SIBO versus breath test for SIBO  Subjective:       She presents here for follow-up to discuss further care  She has had extensive evaluation for dyspepsia and right upper quadrant abdominal discomfort  She has notice right upper quadrant abdominal discomfort is associated with sit-ups or at least in the same region with similar type pain  She has tried dietary modifications with some benefit  She is currently on probiotics  She has had extensive evaluation including right upper quadrant ultrasound, EGD and colonoscopy evaluation, and CT scan  Evaluation has been negative so far  Right upper quadrant ultrasound did show incidental finding of fatty liver disease  She is working on weight loss  Objective:     Vitals: Blood pressure 118/60, pulse 63, temperature 98 7 °F (37 1 °C), temperature source Tympanic, height 5' 8" (1 727 m), weight 93 kg (205 lb)  ,Body mass index is 31 17 kg/m²      [unfilled]    Physical Exam:    GEN: wn/wd, NAD  HEENT: MMM, no cervical or supraclavicular LAD, anciteric  CV: RRR, no m/r/g  CHEST: CTA b/l, no w/r/r  ABD: +BS, soft, NT/ND, no hepatosplenomegaly  EXT: no c/c/e  SKIN: no rashes  NEURO: aaox3      Invasive Devices     None                         Lab, Imaging and other studies:     No visits with results within 1 Day(s) from this visit  Latest known visit with results is:   Hospital Outpatient Visit on 06/08/2021   Component Date Value    EXT Preg Test, Ur 06/08/2021 Negative     Control 06/08/2021 Valid     Case Report 06/08/2021                      Value:Surgical Pathology Report                         Case: [id number]                                   Authorizing Provider:  Karen Steiner MD           Collected:           06/08/2021 1426              [facility]        Received:            06/08/2021 [facility]                                                     Pathologist:           Geraldo Sibley MD                                                   Specimens:   A) - Duodenum, Duodenum biopsy r/o celiac                                                           B) - Stomach, Gastric biopsy r/o h pylori                                                           C) - Polyp, Colorectal, sigmoid colon polypx2 cold snare                                   Final Diagnosis 06/08/2021                      Value: This result contains rich text formatting which cannot be displayed here   Additional Information 06/08/2021                      Value: This result contains rich text formatting which cannot be displayed here  [place] Description 06/08/2021                      Value: This result contains rich text formatting which cannot be displayed here  I have personally reviewed pertinent reports  No current facility-administered medications for this visit

## 2021-07-09 NOTE — PATIENT INSTRUCTIONS
Continue probiotics   Trial of low FODMAP diet   We will plan for HIDA scan  We can consider treatment sibo

## 2021-08-23 ENCOUNTER — RA CDI HCC (OUTPATIENT)
Dept: OTHER | Facility: HOSPITAL | Age: 46
End: 2021-08-23

## 2021-08-23 NOTE — PROGRESS NOTES
[facility]  coding opportunities       Chart reviewed, no opportunity found: CHART REVIEWED, NO OPPORTUNITY FOUND                        Patients insurance company: [employer] (Medicare Advantage and Commercial)

## 2021-10-12 ENCOUNTER — OFFICE VISIT (OUTPATIENT)
Dept: FAMILY MEDICINE CLINIC | Facility: CLINIC | Age: 46
End: 2021-10-12
Payer: COMMERCIAL

## 2021-10-12 VITALS
SYSTOLIC BLOOD PRESSURE: 108 MMHG | WEIGHT: 203.6 LBS | HEART RATE: 104 BPM | BODY MASS INDEX: 30.86 KG/M2 | DIASTOLIC BLOOD PRESSURE: 82 MMHG | HEIGHT: 68 IN | RESPIRATION RATE: 16 BRPM | TEMPERATURE: 97.5 F | OXYGEN SATURATION: 96 %

## 2021-10-12 DIAGNOSIS — H00.014 HORDEOLUM EXTERNUM OF LEFT UPPER EYELID: Primary | ICD-10-CM

## 2021-10-12 PROCEDURE — 99213 OFFICE O/P EST LOW 20 MIN: CPT | Performed by: FAMILY MEDICINE

## 2021-10-12 PROCEDURE — 3008F BODY MASS INDEX DOCD: CPT | Performed by: FAMILY MEDICINE

## 2021-10-12 PROCEDURE — 3725F SCREEN DEPRESSION PERFORMED: CPT | Performed by: FAMILY MEDICINE

## 2021-10-12 RX ORDER — CEPHALEXIN 500 MG/1
500 CAPSULE ORAL EVERY 12 HOURS SCHEDULED
Qty: 14 CAPSULE | Refills: 0 | Status: SHIPPED | OUTPATIENT
Start: 2021-10-12 | End: 2021-10-19

## 2021-10-12 RX ORDER — TOBRAMYCIN 3 MG/ML
1 SOLUTION/ DROPS OPHTHALMIC
Qty: 1.3 ML | Refills: 0 | Status: SHIPPED | OUTPATIENT
Start: 2021-10-12 | End: 2021-10-17

## 2022-01-05 ENCOUNTER — OFFICE VISIT (OUTPATIENT)
Dept: FAMILY MEDICINE CLINIC | Facility: CLINIC | Age: 47
End: 2022-01-05
Payer: COMMERCIAL

## 2022-01-05 ENCOUNTER — APPOINTMENT (OUTPATIENT)
Dept: LAB | Facility: CLINIC | Age: 47
End: 2022-01-05
Payer: COMMERCIAL

## 2022-01-05 VITALS
DIASTOLIC BLOOD PRESSURE: 70 MMHG | WEIGHT: 198.4 LBS | BODY MASS INDEX: 30.07 KG/M2 | HEART RATE: 99 BPM | RESPIRATION RATE: 16 BRPM | HEIGHT: 68 IN | TEMPERATURE: 97.2 F | SYSTOLIC BLOOD PRESSURE: 108 MMHG | OXYGEN SATURATION: 97 %

## 2022-01-05 DIAGNOSIS — H00.014 HORDEOLUM EXTERNUM OF LEFT UPPER EYELID: Primary | ICD-10-CM

## 2022-01-05 DIAGNOSIS — Z20.822 ENCOUNTER FOR LABORATORY TESTING FOR COVID-19 VIRUS: ICD-10-CM

## 2022-01-05 DIAGNOSIS — Z12.39 ENCOUNTER FOR SCREENING FOR MALIGNANT NEOPLASM OF BREAST, UNSPECIFIED SCREENING MODALITY: ICD-10-CM

## 2022-01-05 LAB — SARS-COV-2 IGG+IGM SERPL QL IA: NORMAL

## 2022-01-05 PROCEDURE — 36415 COLL VENOUS BLD VENIPUNCTURE: CPT

## 2022-01-05 PROCEDURE — 99214 OFFICE O/P EST MOD 30 MIN: CPT | Performed by: FAMILY MEDICINE

## 2022-01-05 PROCEDURE — 86769 SARS-COV-2 COVID-19 ANTIBODY: CPT

## 2022-01-05 PROCEDURE — 3008F BODY MASS INDEX DOCD: CPT | Performed by: FAMILY MEDICINE

## 2022-01-05 NOTE — PROGRESS NOTES
Assessment/Plan:  Chief Complaint   Patient presents with   uRiz Gonsalez     pt is here w/ c/o stye on left eye that has not gone away times 2 months      Patient Instructions   Left upper eyelid stye, chronic and will need ophthalmology consult as warm compresses did not help  Rec also to get labs for possible covid exposure and sickness in past  Rec getting mammogram as directed  No problem-specific Assessment & Plan notes found for this encounter  Diagnoses and all orders for this visit:    Hordeolum externum of left upper eyelid  -     Ambulatory referral to Ophthalmology; Future    Encounter for screening for malignant neoplasm of breast, unspecified screening modality  -     Mammo screening bilateral w 3d & cad; Future    Encounter for laboratory testing for COVID-19 virus  -     SARS CoV-2 ANTIBODY,IgG; Future          Subjective:      Patient ID: Candelario Calderón is a 40 y o  female  Stye (pt is here w/ c/o stye on left eye that has not gone away times 2 months )      The following portions of the patient's history were reviewed and updated as appropriate: allergies, current medications, past family history, past medical history, past social history, past surgical history and problem list     Review of Systems   Constitutional: Negative  HENT: Negative  Eyes:        Left upper eyelid stye, not painful   Respiratory: Negative  Cardiovascular: Negative  Gastrointestinal: Negative  Endocrine: Negative  Genitourinary: Negative  Musculoskeletal: Negative  Skin: Negative  Allergic/Immunologic: Negative  Neurological: Negative  Hematological: Negative  Psychiatric/Behavioral: Negative            Objective:      /70   Pulse 99   Temp (!) 97 2 °F (36 2 °C) (Temporal)   Resp 16   Ht 5' 7 5" (1 715 m)   Wt 90 kg (198 lb 6 4 oz)   LMP 01/26/2019 (Exact Date)   SpO2 97%   BMI 30 62 kg/m²          Physical Exam  Constitutional:       Appearance: She is well-developed  HENT:      Head: Normocephalic and atraumatic  Eyes:      Conjunctiva/sclera: Conjunctivae normal       Pupils: Pupils are equal, round, and reactive to light  Comments: Stye upper left eyelid   Cardiovascular:      Rate and Rhythm: Normal rate and regular rhythm  Heart sounds: Normal heart sounds  Pulmonary:      Effort: Pulmonary effort is normal       Breath sounds: Normal breath sounds  Musculoskeletal:         General: Normal range of motion  Cervical back: Normal range of motion and neck supple  Skin:     General: Skin is warm and dry  Neurological:      Mental Status: She is alert and oriented to person, place, and time  Deep Tendon Reflexes: Reflexes are normal and symmetric     Psychiatric:         Behavior: Behavior normal

## 2022-01-05 NOTE — PATIENT INSTRUCTIONS
Left upper eyelid stye, chronic and will need ophthalmology consult as warm compresses did not help  Rec also to get labs for possible covid exposure and sickness in past  Rec getting mammogram as directed

## 2022-08-15 DIAGNOSIS — K21.9 GASTROESOPHAGEAL REFLUX DISEASE WITHOUT ESOPHAGITIS: ICD-10-CM

## 2022-08-16 RX ORDER — OMEPRAZOLE 20 MG/1
CAPSULE, DELAYED RELEASE ORAL
Qty: 90 CAPSULE | Refills: 3 | Status: SHIPPED | OUTPATIENT
Start: 2022-08-16

## 2023-06-06 DIAGNOSIS — Z72.0 TOBACCO ABUSE: ICD-10-CM

## 2023-06-06 RX ORDER — NICOTINE 21 MG/24HR
1 PATCH, TRANSDERMAL 24 HOURS TRANSDERMAL EVERY 24 HOURS
Qty: 28 PATCH | Refills: 0 | Status: SHIPPED | OUTPATIENT
Start: 2023-06-06

## 2023-09-19 ENCOUNTER — OFFICE VISIT (OUTPATIENT)
Dept: FAMILY MEDICINE CLINIC | Facility: CLINIC | Age: 48
End: 2023-09-19
Payer: COMMERCIAL

## 2023-09-19 VITALS
HEIGHT: 69 IN | BODY MASS INDEX: 30.87 KG/M2 | SYSTOLIC BLOOD PRESSURE: 130 MMHG | DIASTOLIC BLOOD PRESSURE: 82 MMHG | HEART RATE: 79 BPM | WEIGHT: 208.4 LBS | TEMPERATURE: 97.5 F | OXYGEN SATURATION: 95 %

## 2023-09-19 DIAGNOSIS — R10.11 RIGHT UPPER QUADRANT PAIN: Primary | ICD-10-CM

## 2023-09-19 DIAGNOSIS — M54.14 THORACIC RADICULITIS: ICD-10-CM

## 2023-09-19 DIAGNOSIS — R10.13 DYSPEPSIA: ICD-10-CM

## 2023-09-19 DIAGNOSIS — K21.00 GASTROESOPHAGEAL REFLUX DISEASE WITH ESOPHAGITIS WITHOUT HEMORRHAGE: ICD-10-CM

## 2023-09-19 DIAGNOSIS — R91.1 PULMONARY NODULE: ICD-10-CM

## 2023-09-19 DIAGNOSIS — R10.9 FLANK PAIN: ICD-10-CM

## 2023-09-19 LAB
BACTERIA UR QL AUTO: ABNORMAL /HPF
BILIRUB UR QL STRIP: NEGATIVE
CLARITY UR: CLEAR
COLOR UR: ABNORMAL
GLUCOSE UR STRIP-MCNC: NEGATIVE MG/DL
HGB UR QL STRIP.AUTO: NEGATIVE
KETONES UR STRIP-MCNC: NEGATIVE MG/DL
LEUKOCYTE ESTERASE UR QL STRIP: NEGATIVE
MUCOUS THREADS UR QL AUTO: ABNORMAL
NITRITE UR QL STRIP: NEGATIVE
NON-SQ EPI CELLS URNS QL MICRO: ABNORMAL /HPF
PH UR STRIP.AUTO: 6 [PH]
PROT UR STRIP-MCNC: ABNORMAL MG/DL
RBC #/AREA URNS AUTO: ABNORMAL /HPF
SP GR UR STRIP.AUTO: 1.02 (ref 1–1.03)
UROBILINOGEN UR STRIP-ACNC: <2 MG/DL
WBC #/AREA URNS AUTO: ABNORMAL /HPF

## 2023-09-19 PROCEDURE — 81001 URINALYSIS AUTO W/SCOPE: CPT | Performed by: FAMILY MEDICINE

## 2023-09-19 PROCEDURE — 99204 OFFICE O/P NEW MOD 45 MIN: CPT | Performed by: FAMILY MEDICINE

## 2023-09-19 PROCEDURE — 87086 URINE CULTURE/COLONY COUNT: CPT | Performed by: FAMILY MEDICINE

## 2023-09-19 RX ORDER — DICYCLOMINE HCL 20 MG
20 TABLET ORAL EVERY 6 HOURS
Qty: 60 TABLET | Refills: 1 | Status: SHIPPED | OUTPATIENT
Start: 2023-09-19

## 2023-09-19 NOTE — PROGRESS NOTES
Assessment/Plan: UA C&S done at this time. Patient had laboratory studies done. Patient have CAT scan of the chest abdomen and pelvis for conditions listed below. Patient will try dicyclomine as directed. Patient will follow-up in 1 month. Diagnoses and all orders for this visit:    Right upper quadrant pain  -     CT chest abdomen pelvis w contrast; Future  -     CBC and differential; Future  -     Comprehensive metabolic panel; Future  -     Lipid panel; Future  -     TSH, 3rd generation with Free T4 reflex; Future  -     Lipase; Future  -     dicyclomine (BENTYL) 20 mg tablet; Take 1 tablet (20 mg total) by mouth every 6 (six) hours    Pulmonary nodule  -     CT chest abdomen pelvis w contrast; Future  -     CBC and differential; Future  -     Comprehensive metabolic panel; Future  -     Lipid panel; Future  -     TSH, 3rd generation with Free T4 reflex; Future  -     Lipase; Future    Gastroesophageal reflux disease with esophagitis without hemorrhage  -     dicyclomine (BENTYL) 20 mg tablet; Take 1 tablet (20 mg total) by mouth every 6 (six) hours    Thoracic radiculitis  -     XR spine thoracic 3 vw; Future    Dyspepsia  -     CBC and differential; Future  -     Comprehensive metabolic panel; Future  -     Lipid panel; Future  -     TSH, 3rd generation with Free T4 reflex; Future  -     Lipase; Future  -     dicyclomine (BENTYL) 20 mg tablet; Take 1 tablet (20 mg total) by mouth every 6 (six) hours            Subjective:        Patient ID: Nelia Barahona is a 53 y.o. female. Patient is here as a new patient to establish care. Past medical history surgical history allergies medication family's and social history are reviewed at present time. Patient with some right upper quadrant pain intermittently over multiple years. Patient does have spasming in the right upper quadrant on a daily basis. This will last for roughly 1 minute then patient is left in pain.   Patient does notice cramping throughout her body. No dysuria or hematuria. Patient with some constipation. Patient had colonoscopy roughly 2 years ago which was normal.  Patient has noticed distention of her abdomen. Reflux symptoms have diminished/improved. Patient only using ibuprofen intermittently. Patient has had multiple CAT scans ultrasounds laboratory studies done.         The following portions of the patient's history were reviewed and updated as appropriate: allergies, current medications, past family history, past medical history, past social history, past surgical history and problem list.      Review of Systems        Objective:               /82 (BP Location: Left arm, Patient Position: Sitting, Cuff Size: Standard)   Pulse 79   Temp 97.5 °F (36.4 °C) (Temporal)   Ht 5' 9" (1.753 m)   Wt 94.5 kg (208 lb 6.4 oz)   LMP 01/26/2019 (Exact Date)   SpO2 95%   BMI 30.78 kg/m²          Physical Exam

## 2023-09-21 DIAGNOSIS — M54.14 THORACIC RADICULITIS: Primary | ICD-10-CM

## 2023-09-21 LAB — BACTERIA UR CULT: NORMAL

## 2023-09-22 RX ORDER — IBUPROFEN 600 MG/1
600 TABLET ORAL EVERY 6 HOURS PRN
Qty: 30 TABLET | Refills: 5 | Status: SHIPPED | OUTPATIENT
Start: 2023-09-22

## 2023-09-25 ENCOUNTER — APPOINTMENT (OUTPATIENT)
Dept: RADIOLOGY | Facility: MEDICAL CENTER | Age: 48
End: 2023-09-25
Payer: COMMERCIAL

## 2023-09-25 DIAGNOSIS — M54.14 THORACIC RADICULITIS: ICD-10-CM

## 2023-09-25 PROCEDURE — 72072 X-RAY EXAM THORAC SPINE 3VWS: CPT

## 2023-10-02 ENCOUNTER — APPOINTMENT (OUTPATIENT)
Dept: LAB | Facility: MEDICAL CENTER | Age: 48
End: 2023-10-02
Payer: COMMERCIAL

## 2023-10-02 DIAGNOSIS — R10.11 RIGHT UPPER QUADRANT PAIN: ICD-10-CM

## 2023-10-02 DIAGNOSIS — R10.13 DYSPEPSIA: ICD-10-CM

## 2023-10-02 DIAGNOSIS — R91.1 PULMONARY NODULE: ICD-10-CM

## 2023-10-02 LAB
ALBUMIN SERPL BCP-MCNC: 4.3 G/DL (ref 3.5–5)
ALP SERPL-CCNC: 80 U/L (ref 34–104)
ALT SERPL W P-5'-P-CCNC: 34 U/L (ref 7–52)
ANION GAP SERPL CALCULATED.3IONS-SCNC: 7 MMOL/L
AST SERPL W P-5'-P-CCNC: 29 U/L (ref 13–39)
BASOPHILS # BLD AUTO: 0.06 THOUSANDS/ÂΜL (ref 0–0.1)
BASOPHILS NFR BLD AUTO: 1 % (ref 0–1)
BILIRUB SERPL-MCNC: 0.7 MG/DL (ref 0.2–1)
BUN SERPL-MCNC: 9 MG/DL (ref 5–25)
CALCIUM SERPL-MCNC: 9.3 MG/DL (ref 8.4–10.2)
CHLORIDE SERPL-SCNC: 104 MMOL/L (ref 96–108)
CHOLEST SERPL-MCNC: 170 MG/DL
CO2 SERPL-SCNC: 28 MMOL/L (ref 21–32)
CREAT SERPL-MCNC: 0.74 MG/DL (ref 0.6–1.3)
EOSINOPHIL # BLD AUTO: 0.04 THOUSAND/ÂΜL (ref 0–0.61)
EOSINOPHIL NFR BLD AUTO: 0 % (ref 0–6)
ERYTHROCYTE [DISTWIDTH] IN BLOOD BY AUTOMATED COUNT: 12.8 % (ref 11.6–15.1)
GFR SERPL CREATININE-BSD FRML MDRD: 96 ML/MIN/1.73SQ M
GLUCOSE P FAST SERPL-MCNC: 83 MG/DL (ref 65–99)
HCT VFR BLD AUTO: 45.9 % (ref 34.8–46.1)
HDLC SERPL-MCNC: 77 MG/DL
HGB BLD-MCNC: 15.6 G/DL (ref 11.5–15.4)
IMM GRANULOCYTES # BLD AUTO: 0.03 THOUSAND/UL (ref 0–0.2)
IMM GRANULOCYTES NFR BLD AUTO: 0 % (ref 0–2)
LDLC SERPL CALC-MCNC: 79 MG/DL (ref 0–100)
LIPASE SERPL-CCNC: 10 U/L (ref 11–82)
LYMPHOCYTES # BLD AUTO: 2.12 THOUSANDS/ÂΜL (ref 0.6–4.47)
LYMPHOCYTES NFR BLD AUTO: 23 % (ref 14–44)
MCH RBC QN AUTO: 32.2 PG (ref 26.8–34.3)
MCHC RBC AUTO-ENTMCNC: 34 G/DL (ref 31.4–37.4)
MCV RBC AUTO: 95 FL (ref 82–98)
MONOCYTES # BLD AUTO: 0.59 THOUSAND/ÂΜL (ref 0.17–1.22)
MONOCYTES NFR BLD AUTO: 6 % (ref 4–12)
NEUTROPHILS # BLD AUTO: 6.33 THOUSANDS/ÂΜL (ref 1.85–7.62)
NEUTS SEG NFR BLD AUTO: 70 % (ref 43–75)
NONHDLC SERPL-MCNC: 93 MG/DL
NRBC BLD AUTO-RTO: 0 /100 WBCS
PLATELET # BLD AUTO: 252 THOUSANDS/UL (ref 149–390)
PMV BLD AUTO: 11.3 FL (ref 8.9–12.7)
POTASSIUM SERPL-SCNC: 3.9 MMOL/L (ref 3.5–5.3)
PROT SERPL-MCNC: 7.1 G/DL (ref 6.4–8.4)
RBC # BLD AUTO: 4.85 MILLION/UL (ref 3.81–5.12)
SODIUM SERPL-SCNC: 139 MMOL/L (ref 135–147)
TRIGL SERPL-MCNC: 68 MG/DL
TSH SERPL DL<=0.05 MIU/L-ACNC: 2.13 UIU/ML (ref 0.45–4.5)
WBC # BLD AUTO: 9.17 THOUSAND/UL (ref 4.31–10.16)

## 2023-10-02 PROCEDURE — 83690 ASSAY OF LIPASE: CPT

## 2023-10-02 PROCEDURE — 84443 ASSAY THYROID STIM HORMONE: CPT

## 2023-10-02 PROCEDURE — 80053 COMPREHEN METABOLIC PANEL: CPT

## 2023-10-02 PROCEDURE — 85025 COMPLETE CBC W/AUTO DIFF WBC: CPT

## 2023-10-02 PROCEDURE — 80061 LIPID PANEL: CPT

## 2023-10-02 PROCEDURE — 36415 COLL VENOUS BLD VENIPUNCTURE: CPT

## 2023-10-19 ENCOUNTER — OFFICE VISIT (OUTPATIENT)
Dept: FAMILY MEDICINE CLINIC | Facility: CLINIC | Age: 48
End: 2023-10-19
Payer: COMMERCIAL

## 2023-10-19 VITALS
TEMPERATURE: 97.9 F | BODY MASS INDEX: 30.66 KG/M2 | HEIGHT: 69 IN | WEIGHT: 207 LBS | HEART RATE: 98 BPM | DIASTOLIC BLOOD PRESSURE: 80 MMHG | SYSTOLIC BLOOD PRESSURE: 120 MMHG | OXYGEN SATURATION: 98 %

## 2023-10-19 DIAGNOSIS — R10.11 RIGHT UPPER QUADRANT PAIN: Primary | ICD-10-CM

## 2023-10-19 DIAGNOSIS — M54.14 THORACIC RADICULITIS: ICD-10-CM

## 2023-10-19 DIAGNOSIS — M54.16 LUMBAR RADICULITIS: ICD-10-CM

## 2023-10-19 DIAGNOSIS — K21.00 GASTROESOPHAGEAL REFLUX DISEASE WITH ESOPHAGITIS WITHOUT HEMORRHAGE: ICD-10-CM

## 2023-10-19 PROCEDURE — 99214 OFFICE O/P EST MOD 30 MIN: CPT | Performed by: FAMILY MEDICINE

## 2023-10-19 NOTE — PROGRESS NOTES
Assessment/Plan: X-ray thoracic spine normal.  Urine culture as well as all laboratory studies done in October reviewed at present time. Patient to go for CAT scan of the abdomen and pelvis tomorrow. Patient did have EGD as well as colonoscopy which were reviewed from 2021. Patient will be referred to physical therapy. Patient use Metamucil daily as directed. Follow-up in 6 weeks. Patient will consider MRI of the thoracic and lumbar spine. To consider referral to GI also for further work-up. Diagnoses and all orders for this visit:    Right upper quadrant pain    Gastroesophageal reflux disease with esophagitis without hemorrhage    Thoracic radiculitis  -     Ambulatory Referral to Physical Therapy; Future    Lumbar radiculitis  -     XR spine lumbar minimum 4 views non injury; Future  -     Ambulatory Referral to Physical Therapy; Future            Subjective:        Patient ID: Elizabeth Mooney is a 53 y.o. female. Patient is here to follow-up on right upper quadrant pain, reflux symptoms, thoracic radiculitis. Patient did have studies completed other than CAT scan which is scheduled for tomorrow. Patient with ongoing back pain. Patient does get abdominal pain and bloating. Patient has some cramping of the abdomen which occurs mainly with driving. Patient notices when this occurs that her feet go numb. Fingertips also going down. Patient with some anxiety. Patient with intermittent constipation diarrhea. This been going on for multiple years. No change in urination. Patient saw no improvement with dicyclomine. The following portions of the patient's history were reviewed and updated as appropriate: allergies, current medications, past family history, past medical history, past social history, past surgical history and problem list.      Review of Systems   Constitutional: Negative. HENT: Negative. Eyes: Negative. Respiratory: Negative. Cardiovascular: Negative. Gastrointestinal:  Positive for abdominal distention, abdominal pain, constipation and diarrhea. Endocrine: Negative. Genitourinary: Negative. Musculoskeletal: Negative. Skin: Negative. Allergic/Immunologic: Negative. Neurological: Negative. Hematological: Negative. Psychiatric/Behavioral:  The patient is nervous/anxious. Objective:      BMI Counseling: Body mass index is 30.57 kg/m². The BMI is above normal. Nutrition recommendations include encouraging healthy choices of fruits and vegetables. Exercise recommendations include moderate physical activity 150 minutes/week. Rationale for BMI follow-up plan is due to patient being overweight or obese. Tobacco Cessation Counseling: Tobacco cessation counseling was provided. The patient is sincerely urged to quit consumption of tobacco. She is not ready to quit tobacco. Medication options discussed. /80 (BP Location: Right arm, Patient Position: Sitting, Cuff Size: Standard)   Pulse 98   Temp 97.9 °F (36.6 °C) (Temporal)   Ht 5' 9" (1.753 m)   Wt 93.9 kg (207 lb)   LMP 01/26/2019 (Exact Date)   SpO2 98%   BMI 30.57 kg/m²          Physical Exam  Vitals and nursing note reviewed. Constitutional:       General: She is not in acute distress. Appearance: Normal appearance. She is not ill-appearing, toxic-appearing or diaphoretic. HENT:      Head: Normocephalic and atraumatic. Right Ear: Tympanic membrane, ear canal and external ear normal. There is no impacted cerumen. Left Ear: Tympanic membrane, ear canal and external ear normal. There is no impacted cerumen. Nose: Nose normal. No congestion or rhinorrhea. Mouth/Throat:      Mouth: Mucous membranes are moist.      Pharynx: No oropharyngeal exudate or posterior oropharyngeal erythema. Eyes:      General: No scleral icterus. Right eye: No discharge. Left eye: No discharge. Neck:      Vascular: No carotid bruit. Cardiovascular:      Rate and Rhythm: Normal rate and regular rhythm. Pulses: Normal pulses. Heart sounds: Normal heart sounds. No murmur heard. No friction rub. No gallop. Pulmonary:      Effort: Pulmonary effort is normal. No respiratory distress. Breath sounds: Normal breath sounds. No stridor. No wheezing, rhonchi or rales. Chest:      Chest wall: No tenderness. Abdominal:      Palpations: Abdomen is soft. Tenderness: There is abdominal tenderness. There is no guarding or rebound. Musculoskeletal:         General: Tenderness present. No swelling, deformity or signs of injury. Cervical back: Normal range of motion and neck supple. No rigidity. No muscular tenderness. Right lower leg: No edema. Left lower leg: No edema. Comments: Pain with palpation lumbar region with palpation   Lymphadenopathy:      Cervical: No cervical adenopathy. Skin:     General: Skin is warm and dry. Capillary Refill: Capillary refill takes less than 2 seconds. Coloration: Skin is not jaundiced. Findings: No bruising, erythema, lesion or rash. Neurological:      Mental Status: She is alert and oriented to person, place, and time. Mental status is at baseline. Cranial Nerves: No cranial nerve deficit. Sensory: No sensory deficit. Motor: No weakness. Coordination: Coordination normal.      Gait: Gait normal.   Psychiatric:         Mood and Affect: Mood normal.         Behavior: Behavior normal.         Thought Content:  Thought content normal.         Judgment: Judgment normal.

## 2023-10-20 ENCOUNTER — HOSPITAL ENCOUNTER (OUTPATIENT)
Dept: RADIOLOGY | Facility: HOSPITAL | Age: 48
Discharge: HOME | End: 2023-10-20
Payer: COMMERCIAL

## 2023-10-20 ENCOUNTER — HOSPITAL ENCOUNTER (OUTPATIENT)
Dept: CT IMAGING | Facility: HOSPITAL | Age: 48
Discharge: HOME | End: 2023-10-20
Payer: COMMERCIAL

## 2023-10-20 DIAGNOSIS — M54.16 LUMBAR RADICULITIS: ICD-10-CM

## 2023-10-20 DIAGNOSIS — R91.1 PULMONARY NODULE: ICD-10-CM

## 2023-10-20 DIAGNOSIS — R10.11 RIGHT UPPER QUADRANT PAIN: ICD-10-CM

## 2023-10-20 PROCEDURE — 71260 CT THORAX DX C+: CPT

## 2023-10-20 PROCEDURE — 74177 CT ABD & PELVIS W/CONTRAST: CPT

## 2023-10-20 PROCEDURE — G1004 CDSM NDSC: HCPCS

## 2023-10-20 RX ADMIN — IOHEXOL 100 ML: 350 INJECTION, SOLUTION INTRAVENOUS at 14:18

## 2023-10-23 DIAGNOSIS — M54.14 THORACIC RADICULITIS: ICD-10-CM

## 2023-10-24 RX ORDER — IBUPROFEN 600 MG/1
600 TABLET ORAL EVERY 6 HOURS
Qty: 30 TABLET | Refills: 0 | OUTPATIENT
Start: 2023-10-24

## 2023-10-24 RX ORDER — IBUPROFEN 600 MG/1
600 TABLET ORAL EVERY 6 HOURS PRN
Qty: 30 TABLET | Refills: 3 | Status: SHIPPED | OUTPATIENT
Start: 2023-10-24

## 2023-10-30 DIAGNOSIS — M79.9 SOFT TISSUE LESION OF PELVIC REGION: Primary | ICD-10-CM

## 2023-11-03 ENCOUNTER — APPOINTMENT (OUTPATIENT)
Dept: RADIOLOGY | Facility: MEDICAL CENTER | Age: 48
End: 2023-11-03
Payer: COMMERCIAL

## 2023-11-03 PROCEDURE — 72110 X-RAY EXAM L-2 SPINE 4/>VWS: CPT

## 2023-12-18 ENCOUNTER — HOSPITAL ENCOUNTER (OUTPATIENT)
Dept: ULTRASOUND IMAGING | Facility: MEDICAL CENTER | Age: 48
Discharge: HOME | End: 2023-12-18
Payer: COMMERCIAL

## 2023-12-18 DIAGNOSIS — M79.9 SOFT TISSUE LESION OF PELVIC REGION: ICD-10-CM

## 2023-12-18 PROCEDURE — 76856 US EXAM PELVIC COMPLETE: CPT

## 2023-12-18 PROCEDURE — 76830 TRANSVAGINAL US NON-OB: CPT

## 2024-01-09 ENCOUNTER — OFFICE VISIT (OUTPATIENT)
Dept: FAMILY MEDICINE CLINIC | Facility: CLINIC | Age: 49
End: 2024-01-09
Payer: COMMERCIAL

## 2024-01-09 VITALS
HEIGHT: 69 IN | SYSTOLIC BLOOD PRESSURE: 117 MMHG | TEMPERATURE: 97.2 F | WEIGHT: 203 LBS | BODY MASS INDEX: 30.07 KG/M2 | DIASTOLIC BLOOD PRESSURE: 80 MMHG | OXYGEN SATURATION: 96 % | HEART RATE: 87 BPM

## 2024-01-09 DIAGNOSIS — R10.11 RIGHT UPPER QUADRANT PAIN: ICD-10-CM

## 2024-01-09 DIAGNOSIS — K56.691 OTHER COMPLETE INTESTINAL OBSTRUCTION (HCC): ICD-10-CM

## 2024-01-09 DIAGNOSIS — R10.84 GENERALIZED ABDOMINAL PAIN: Primary | ICD-10-CM

## 2024-01-09 DIAGNOSIS — K59.04 CHRONIC IDIOPATHIC CONSTIPATION: ICD-10-CM

## 2024-01-09 PROBLEM — K56.609 BOWEL OBSTRUCTION (HCC): Status: ACTIVE | Noted: 2024-01-09

## 2024-01-09 PROCEDURE — 99215 OFFICE O/P EST HI 40 MIN: CPT | Performed by: FAMILY MEDICINE

## 2024-01-09 NOTE — PROGRESS NOTES
Assessment/Plan: Guidance given overall.  Long discussion with patient.  Patient without flatus and unable to pass gas.  Patient with significant abdominal discomfort at this time which is worse than its ever been.  Concern for bowel obstruction or other significant acute etiology in her abdomen.  Recommend patient going to emergency room for workup.  Patient in agreement and understands.  Patient states pain is 10 out of 10.  Patient understands risks of not going.  Patient will follow-up after ER/hospitalization       Diagnoses and all orders for this visit:    Generalized abdominal pain    Right upper quadrant pain    Chronic idiopathic constipation    Other complete intestinal obstruction (HCC)            Subjective:        Patient ID: Sherrill Person is a 48 y.o. female.      Patient is here with constant abdominal pain for the past week or so.  Patient's pain is in the right upper quadrant with some radiation to the lower quadrants bilaterally.  Patient having some back pain associate with this also.  Patient's pain is 10 out of 10.  Patient does notice pain is worse when bladder is full.  Some improvement with emptying bladder.  Patient with constipation.  Patient to use Dulcolax.  Patient with nausea but no vomiting.  No fever.  Patient with belching.  Patient does see improvement with belching and then pain reinstated this.  No flatus.  Pain can wax and wane in intensity.  Patient improves with standing but worse with sitting.  Patient having spasms around umbilicus.  Patient without flatus.  Unable to pass gas.  Patient concerned about obstruction.  Pain worse than it has been in the past    Abdominal Pain  Associated symptoms include constipation and nausea.   Back Pain  Associated symptoms include abdominal pain.         The following portions of the patient's history were reviewed and updated as appropriate: allergies, current medications, past family history, past medical history, past social  "history, past surgical history and problem list.      Review of Systems   Constitutional: Negative.    HENT: Negative.     Eyes: Negative.    Respiratory: Negative.     Cardiovascular: Negative.    Gastrointestinal:  Positive for abdominal distention, abdominal pain, constipation and nausea.   Endocrine: Negative.    Genitourinary: Negative.    Musculoskeletal:  Positive for back pain.   Skin: Negative.    Allergic/Immunologic: Negative.    Neurological: Negative.    Hematological: Negative.    Psychiatric/Behavioral: Negative.             Objective:        Tobacco Cessation Counseling: Tobacco cessation counseling was provided. The patient is sincerely urged to quit consumption of tobacco. She is not ready to quit tobacco.             /80 (BP Location: Left arm, Patient Position: Sitting, Cuff Size: Standard)   Pulse 87   Temp (!) 97.2 °F (36.2 °C) (Temporal)   Ht 5' 9\" (1.753 m)   Wt 92.1 kg (203 lb)   LMP 01/26/2019 (Exact Date)   SpO2 96%   BMI 29.98 kg/m²          Physical Exam  Vitals and nursing note reviewed.   Constitutional:       General: She is not in acute distress.     Appearance: She is ill-appearing. She is not toxic-appearing or diaphoretic.   HENT:      Head: Normocephalic and atraumatic.      Right Ear: Tympanic membrane, ear canal and external ear normal. There is no impacted cerumen.      Left Ear: Tympanic membrane, ear canal and external ear normal. There is no impacted cerumen.      Nose: Nose normal. No congestion or rhinorrhea.      Mouth/Throat:      Mouth: Mucous membranes are moist.      Pharynx: No oropharyngeal exudate or posterior oropharyngeal erythema.   Eyes:      General: No scleral icterus.        Right eye: No discharge.         Left eye: No discharge.   Neck:      Vascular: No carotid bruit.   Cardiovascular:      Rate and Rhythm: Normal rate and regular rhythm.      Pulses: Normal pulses.      Heart sounds: Normal heart sounds. No murmur heard.     No friction " rub. No gallop.   Pulmonary:      Effort: Pulmonary effort is normal. No respiratory distress.      Breath sounds: Normal breath sounds. No stridor. No wheezing, rhonchi or rales.   Chest:      Chest wall: No tenderness.   Abdominal:      General: Bowel sounds are normal. There is distension.      Tenderness: There is abdominal tenderness in the right upper quadrant, right lower quadrant and left lower quadrant. There is guarding.   Musculoskeletal:         General: No swelling, tenderness, deformity or signs of injury. Normal range of motion.      Cervical back: Normal range of motion and neck supple. No rigidity. No muscular tenderness.      Right lower leg: No edema.      Left lower leg: No edema.   Lymphadenopathy:      Cervical: No cervical adenopathy.   Skin:     General: Skin is warm and dry.      Capillary Refill: Capillary refill takes less than 2 seconds.      Coloration: Skin is not jaundiced.      Findings: No bruising, erythema, lesion or rash.   Neurological:      Mental Status: She is alert and oriented to person, place, and time. Mental status is at baseline.      Cranial Nerves: No cranial nerve deficit.      Sensory: No sensory deficit.      Motor: No weakness.      Coordination: Coordination normal.      Gait: Gait normal.   Psychiatric:         Mood and Affect: Mood normal.         Behavior: Behavior normal.         Thought Content: Thought content normal.         Judgment: Judgment normal.

## 2024-01-17 DIAGNOSIS — K58.9 IRRITABLE BOWEL SYNDROME, UNSPECIFIED TYPE: Primary | ICD-10-CM

## 2024-01-17 NOTE — TELEPHONE ENCOUNTER
dicyclomine (BENTYL) 20 mg tablet     Patient states that the above medication is helping.  They had her on the 20 mg but when she was sent home she realized she had the 10 mg.  Can she take 2 pill?      Please advise patient.

## 2024-01-17 NOTE — TELEPHONE ENCOUNTER
Please double check that it should be Bentyl instead of Benadryl.... Bentyl medication sent over to you for signature. Please review.      Thank you

## 2024-01-18 DIAGNOSIS — K58.9 IRRITABLE BOWEL SYNDROME, UNSPECIFIED TYPE: ICD-10-CM

## 2024-01-18 RX ORDER — DICYCLOMINE HCL 20 MG
20 TABLET ORAL 2 TIMES DAILY
Qty: 60 TABLET | Refills: 2 | Status: SHIPPED | OUTPATIENT
Start: 2024-01-18

## 2024-01-18 RX ORDER — DICYCLOMINE HCL 20 MG
20 TABLET ORAL 2 TIMES DAILY
Qty: 180 TABLET | OUTPATIENT
Start: 2024-01-18

## 2024-01-18 NOTE — TELEPHONE ENCOUNTER
Telephone call from patient wondering why her Dicyclomine was denied. I let her know it's because two requests were sent, one from her and one from the pharmacy. So one got denied, but the other got sent in. So she is going to call the pharmacy to make sure they have it there.

## 2024-01-25 ENCOUNTER — OFFICE VISIT (OUTPATIENT)
Dept: FAMILY MEDICINE CLINIC | Facility: CLINIC | Age: 49
End: 2024-01-25
Payer: COMMERCIAL

## 2024-01-25 VITALS
HEART RATE: 104 BPM | DIASTOLIC BLOOD PRESSURE: 62 MMHG | HEIGHT: 69 IN | TEMPERATURE: 98.1 F | OXYGEN SATURATION: 98 % | WEIGHT: 204 LBS | BODY MASS INDEX: 30.21 KG/M2 | SYSTOLIC BLOOD PRESSURE: 98 MMHG

## 2024-01-25 DIAGNOSIS — F41.9 ANXIETY: ICD-10-CM

## 2024-01-25 DIAGNOSIS — K59.04 CHRONIC IDIOPATHIC CONSTIPATION: Primary | ICD-10-CM

## 2024-01-25 DIAGNOSIS — R10.84 GENERALIZED ABDOMINAL PAIN: ICD-10-CM

## 2024-01-25 PROCEDURE — 99214 OFFICE O/P EST MOD 30 MIN: CPT | Performed by: FAMILY MEDICINE

## 2024-01-25 RX ORDER — ESCITALOPRAM OXALATE 5 MG/1
5 TABLET ORAL DAILY
Qty: 30 TABLET | Refills: 5 | Status: SHIPPED | OUTPATIENT
Start: 2024-01-25

## 2024-01-25 NOTE — PROGRESS NOTES
Assessment/Plan: ER records including laboratory studies CAT scan and ultrasound reviewed at this time.  Patient will continue with Bentyl for abdominal pain which is improving.  Patient will start Lexapro for anxiety.  Patient will follow-up in 2 months       Diagnoses and all orders for this visit:    Chronic idiopathic constipation    Generalized abdominal pain    Anxiety  -     escitalopram (LEXAPRO) 5 mg tablet; Take 1 tablet (5 mg total) by mouth daily            Subjective:        Patient ID: Sherrill Person is a 48 y.o. female.      Patient is here status post ER visit on January 9 for abdominal pain.  Patient had multiple studies done including laboratory studies ultrasound of the abdomen as well as CAT scan of the abdomen and pelvis which were reviewed at present time.  Patient with ongoing pain.  No obstruction noted.  Patient with ongoing abdominal pain.  Patient using Bentyl with some improvement.  Distention is less.  Patient with some nausea possibly related to medication.  No vomiting.  No fever.  Patient is defecating and has flatus.  Normal urination.  Pain roughly 20% improved overall.  Patient with anxiety.          The following portions of the patient's history were reviewed and updated as appropriate: allergies, current medications, past family history, past medical history, past social history, past surgical history and problem list.      Review of Systems   Constitutional: Negative.    HENT: Negative.     Eyes: Negative.    Respiratory: Negative.     Cardiovascular: Negative.    Gastrointestinal:  Positive for abdominal pain.   Endocrine: Negative.    Genitourinary: Negative.    Musculoskeletal: Negative.    Skin: Negative.    Allergic/Immunologic: Negative.    Neurological: Negative.    Hematological: Negative.    Psychiatric/Behavioral: Negative.             Objective:               BP 98/62 (BP Location: Right arm, Patient Position: Sitting, Cuff Size: Standard)   Pulse 104   Temp  "98.1 °F (36.7 °C) (Temporal)   Ht 5' 9\" (1.753 m)   Wt 92.5 kg (204 lb)   LMP 01/26/2019 (Exact Date)   SpO2 98%   BMI 30.13 kg/m²          Physical Exam  Vitals and nursing note reviewed.   Constitutional:       General: She is not in acute distress.     Appearance: Normal appearance. She is not ill-appearing, toxic-appearing or diaphoretic.   HENT:      Head: Normocephalic and atraumatic.      Right Ear: Tympanic membrane, ear canal and external ear normal. There is no impacted cerumen.      Left Ear: Tympanic membrane, ear canal and external ear normal. There is no impacted cerumen.      Nose: Nose normal. No congestion or rhinorrhea.   Eyes:      General: No scleral icterus.        Right eye: No discharge.         Left eye: No discharge.   Neck:      Vascular: No carotid bruit.   Cardiovascular:      Rate and Rhythm: Normal rate and regular rhythm.      Pulses: Normal pulses.      Heart sounds: Normal heart sounds. No murmur heard.     No friction rub. No gallop.   Pulmonary:      Effort: Pulmonary effort is normal. No respiratory distress.      Breath sounds: Normal breath sounds. No stridor. No wheezing, rhonchi or rales.   Chest:      Chest wall: No tenderness.   Abdominal:      General: Abdomen is flat. Bowel sounds are normal. There is no distension.      Palpations: Abdomen is soft.      Tenderness: There is abdominal tenderness. There is no guarding or rebound.   Musculoskeletal:         General: No swelling, tenderness, deformity or signs of injury. Normal range of motion.      Cervical back: Normal range of motion and neck supple. No rigidity. No muscular tenderness.      Right lower leg: No edema.      Left lower leg: No edema.   Lymphadenopathy:      Cervical: No cervical adenopathy.   Skin:     General: Skin is warm and dry.      Capillary Refill: Capillary refill takes less than 2 seconds.      Coloration: Skin is not jaundiced.      Findings: No bruising, erythema, lesion or rash. "   Neurological:      Mental Status: She is alert and oriented to person, place, and time. Mental status is at baseline.      Cranial Nerves: No cranial nerve deficit.      Sensory: No sensory deficit.      Motor: No weakness.      Coordination: Coordination normal.      Gait: Gait normal.   Psychiatric:         Behavior: Behavior normal.         Thought Content: Thought content normal.         Judgment: Judgment normal.      Comments: Mildly anxious

## 2024-02-14 DIAGNOSIS — F41.9 ANXIETY: ICD-10-CM

## 2024-02-14 RX ORDER — ESCITALOPRAM OXALATE 5 MG/1
5 TABLET ORAL DAILY
Qty: 90 TABLET | Refills: 1 | Status: SHIPPED | OUTPATIENT
Start: 2024-02-14

## 2024-03-14 DIAGNOSIS — K58.9 IRRITABLE BOWEL SYNDROME, UNSPECIFIED TYPE: ICD-10-CM

## 2024-03-15 RX ORDER — DICYCLOMINE HCL 20 MG
20 TABLET ORAL 2 TIMES DAILY
Qty: 180 TABLET | Refills: 0 | Status: SHIPPED | OUTPATIENT
Start: 2024-03-15

## 2024-04-22 DIAGNOSIS — K58.9 IRRITABLE BOWEL SYNDROME, UNSPECIFIED TYPE: ICD-10-CM

## 2024-04-22 RX ORDER — DICYCLOMINE HCL 20 MG
20 TABLET ORAL 2 TIMES DAILY
Qty: 180 TABLET | Refills: 1 | Status: SHIPPED | OUTPATIENT
Start: 2024-04-22

## 2024-06-12 ENCOUNTER — VBI (OUTPATIENT)
Dept: ADMINISTRATIVE | Facility: OTHER | Age: 49
End: 2024-06-12

## 2024-08-01 DIAGNOSIS — F41.9 ANXIETY: ICD-10-CM

## 2024-08-01 RX ORDER — ESCITALOPRAM OXALATE 5 MG/1
5 TABLET ORAL DAILY
Qty: 30 TABLET | Refills: 0 | Status: SHIPPED | OUTPATIENT
Start: 2024-08-01

## 2024-08-31 DIAGNOSIS — F41.9 ANXIETY: ICD-10-CM

## 2024-09-01 RX ORDER — ESCITALOPRAM OXALATE 5 MG/1
5 TABLET ORAL DAILY
Qty: 30 TABLET | Refills: 0 | Status: SHIPPED | OUTPATIENT
Start: 2024-09-01

## 2024-09-11 ENCOUNTER — VBI (OUTPATIENT)
Dept: ADMINISTRATIVE | Facility: OTHER | Age: 49
End: 2024-09-11

## 2024-09-11 NOTE — TELEPHONE ENCOUNTER
09/11/24 11:38 AM     Chart reviewed for Pap Smear (HPV) aka Cervical Cancer Screening ; nothing is submitted to the patient's insurance at this time.     Niru Mcleod MA   [facility]

## 2024-10-14 ENCOUNTER — TELEPHONE (OUTPATIENT)
Age: 49
End: 2024-10-14

## 2024-10-14 DIAGNOSIS — K58.9 IRRITABLE BOWEL SYNDROME, UNSPECIFIED TYPE: ICD-10-CM

## 2024-10-14 DIAGNOSIS — F41.9 ANXIETY: ICD-10-CM

## 2024-10-14 RX ORDER — DICYCLOMINE HCL 20 MG
20 TABLET ORAL 2 TIMES DAILY
Qty: 180 TABLET | Refills: 0 | OUTPATIENT
Start: 2024-10-14

## 2024-10-14 RX ORDER — ESCITALOPRAM OXALATE 5 MG/1
5 TABLET ORAL DAILY
Qty: 30 TABLET | Refills: 0 | OUTPATIENT
Start: 2024-10-14

## 2024-10-14 NOTE — TELEPHONE ENCOUNTER
Reason for call:   [x] Refill   [] Prior Auth  [] Other:     Office: [facility]  [x] PCP/Provider - Shawn Roe   [] Specialty/Provider -     Medication: dicyclomine, escitalopram     Dose/Frequency: 20 mg, 5 mg/ twice daily, daily     Quantity: 90 day supply, 30 day supply     Pharmacy: [facility] [place]     Does the patient have enough for 3 days?   [] Yes   [x] No - Send as HP to POD- patient is out completely.

## 2024-10-16 DIAGNOSIS — F41.9 ANXIETY: ICD-10-CM

## 2024-10-16 DIAGNOSIS — K58.9 IRRITABLE BOWEL SYNDROME, UNSPECIFIED TYPE: ICD-10-CM

## 2024-10-16 NOTE — TELEPHONE ENCOUNTER
Last visit 01/25/2024  Next appt 11/01/2024    Patient is requesting a refill for escitalopram (lexapro) tablets and dicyclomine (bentyl) tablets at the [facility] pharmacy. Please advise.

## 2024-10-17 RX ORDER — DICYCLOMINE HCL 20 MG
20 TABLET ORAL 2 TIMES DAILY
Qty: 180 TABLET | Refills: 1 | Status: SHIPPED | OUTPATIENT
Start: 2024-10-17

## 2024-10-17 RX ORDER — ESCITALOPRAM OXALATE 5 MG/1
5 TABLET ORAL DAILY
Qty: 30 TABLET | Refills: 0 | Status: SHIPPED | OUTPATIENT
Start: 2024-10-17

## 2024-11-18 DIAGNOSIS — F41.9 ANXIETY: ICD-10-CM

## 2024-11-20 RX ORDER — ESCITALOPRAM OXALATE 5 MG/1
5 TABLET ORAL DAILY
Qty: 30 TABLET | Refills: 0 | Status: SHIPPED | OUTPATIENT
Start: 2024-11-20

## 2024-11-29 ENCOUNTER — OFFICE VISIT (OUTPATIENT)
Age: 49
End: 2024-11-29
Payer: COMMERCIAL

## 2024-11-29 VITALS
HEIGHT: 69 IN | TEMPERATURE: 96.8 F | SYSTOLIC BLOOD PRESSURE: 120 MMHG | HEART RATE: 62 BPM | OXYGEN SATURATION: 98 % | BODY MASS INDEX: 30.51 KG/M2 | DIASTOLIC BLOOD PRESSURE: 76 MMHG | WEIGHT: 206 LBS

## 2024-11-29 DIAGNOSIS — E66.811 CLASS 1 OBESITY WITH BODY MASS INDEX (BMI) OF 30.0 TO 30.9 IN ADULT, UNSPECIFIED OBESITY TYPE, UNSPECIFIED WHETHER SERIOUS COMORBIDITY PRESENT: ICD-10-CM

## 2024-11-29 DIAGNOSIS — Z00.00 WELL ADULT EXAM: ICD-10-CM

## 2024-11-29 DIAGNOSIS — K58.9 IRRITABLE BOWEL SYNDROME, UNSPECIFIED TYPE: Primary | ICD-10-CM

## 2024-11-29 DIAGNOSIS — F41.9 ANXIETY: ICD-10-CM

## 2024-11-29 DIAGNOSIS — E66.811 CLASS 1 OBESITY WITHOUT SERIOUS COMORBIDITY WITH BODY MASS INDEX (BMI) OF 30.0 TO 30.9 IN ADULT, UNSPECIFIED OBESITY TYPE: ICD-10-CM

## 2024-11-29 PROCEDURE — 99396 PREV VISIT EST AGE 40-64: CPT | Performed by: FAMILY MEDICINE

## 2024-11-29 PROCEDURE — 99214 OFFICE O/P EST MOD 30 MIN: CPT | Performed by: FAMILY MEDICINE

## 2024-11-29 RX ORDER — TIRZEPATIDE 2.5 MG/.5ML
2.5 INJECTION, SOLUTION SUBCUTANEOUS WEEKLY
Qty: 2 ML | Refills: 0 | Status: SHIPPED | OUTPATIENT
Start: 2024-11-29 | End: 2024-12-27

## 2024-11-29 RX ORDER — DICYCLOMINE HCL 20 MG
20 TABLET ORAL 2 TIMES DAILY
Qty: 180 TABLET | Refills: 1 | Status: SHIPPED | OUTPATIENT
Start: 2024-11-29

## 2024-11-29 RX ORDER — ESCITALOPRAM OXALATE 10 MG/1
10 TABLET ORAL DAILY
Qty: 90 TABLET | Refills: 1 | Status: SHIPPED | OUTPATIENT
Start: 2024-11-29

## 2024-11-29 NOTE — PROGRESS NOTES
Assessment/Plan: As well as exam done at this time.  Vaccines reviewed.  To consider Adacel shot in the future.  Labs reviewed and up-to-date.  Patient will see new gynecologist in December.  Colonoscopy done in 2021.  No personal history of pancreatitis or family history of thyroid cancer.       Diagnoses and all orders for this visit:    Well adult exam    Anxiety  -     escitalopram (LEXAPRO) 10 mg tablet; Take 1 tablet (10 mg total) by mouth daily    Irritable bowel syndrome, unspecified type  -     dicyclomine (BENTYL) 20 mg tablet; Take 1 tablet (20 mg total) by mouth 2 (two) times a day    Class 1 obesity without serious comorbidity with body mass index (BMI) of 30.0 to 30.9 in adult, unspecified obesity type    Class 1 obesity with body mass index (BMI) of 30.0 to 30.9 in adult, unspecified obesity type, unspecified whether serious comorbidity present  -     tirzepatide (Zepbound) 2.5 mg/0.5 mL auto-injector; Inject 0.5 mL (2.5 mg total) under the skin once a week for 28 days            Subjective:        Patient ID: Sherrill Person is a 49 y.o. female.      Patient is here for wellness exam.  Labs vaccines reviewed.  Colonoscopy up-to-date done in 2021.  Patient going to see new gynecologist shortly in December.    Abdominal Pain          The following portions of the patient's history were reviewed and updated as appropriate: allergies, current medications, past family history, past medical history, past social history, past surgical history and problem list.      Review of Systems   Constitutional: Negative.    HENT: Negative.     Eyes: Negative.    Respiratory: Negative.     Cardiovascular: Negative.    Gastrointestinal:  Positive for abdominal pain.   Endocrine: Negative.    Genitourinary: Negative.    Musculoskeletal: Negative.    Skin: Negative.    Allergic/Immunologic: Negative.    Neurological: Negative.    Hematological: Negative.    Psychiatric/Behavioral: Negative.             Objective:        "        /76 (BP Location: Left arm, Patient Position: Sitting, Cuff Size: Large)   Pulse 62   Temp (!) 96.8 °F (36 °C) (Temporal)   Ht 5' 9\" (1.753 m)   Wt 93.4 kg (206 lb)   LMP 01/26/2019 (Exact Date)   SpO2 98%   BMI 30.42 kg/m²          Physical Exam  Vitals and nursing note reviewed.   Constitutional:       General: She is not in acute distress.     Appearance: Normal appearance. She is not ill-appearing, toxic-appearing or diaphoretic.   HENT:      Head: Normocephalic and atraumatic.      Right Ear: Tympanic membrane, ear canal and external ear normal. There is no impacted cerumen.      Left Ear: Tympanic membrane, ear canal and external ear normal. There is no impacted cerumen.      Nose: Nose normal. No congestion or rhinorrhea.      Mouth/Throat:      Mouth: Mucous membranes are moist.      Pharynx: No oropharyngeal exudate or posterior oropharyngeal erythema.   Eyes:      General: No scleral icterus.        Left eye: No discharge.   Neck:      Vascular: No carotid bruit.   Cardiovascular:      Rate and Rhythm: Normal rate and regular rhythm.      Pulses: Normal pulses.      Heart sounds: Normal heart sounds. No murmur heard.     No friction rub. No gallop.   Pulmonary:      Effort: Pulmonary effort is normal. No respiratory distress.      Breath sounds: Normal breath sounds. No stridor. No wheezing, rhonchi or rales.   Chest:      Chest wall: No tenderness.   Musculoskeletal:         General: No swelling, tenderness, deformity or signs of injury. Normal range of motion.      Cervical back: Normal range of motion and neck supple. No rigidity. No muscular tenderness.      Right lower leg: No edema.      Left lower leg: No edema.   Lymphadenopathy:      Cervical: No cervical adenopathy.   Skin:     General: Skin is warm and dry.      Capillary Refill: Capillary refill takes less than 2 seconds.      Coloration: Skin is not jaundiced.      Findings: No bruising, erythema, lesion or rash. "   Neurological:      Mental Status: She is alert and oriented to person, place, and time. Mental status is at baseline.      Cranial Nerves: No cranial nerve deficit.      Sensory: No sensory deficit.      Motor: No weakness.      Coordination: Coordination normal.      Gait: Gait normal.   Psychiatric:         Mood and Affect: Mood normal.         Behavior: Behavior normal.         Thought Content: Thought content normal.         Judgment: Judgment normal.

## 2024-12-03 ENCOUNTER — TELEPHONE (OUTPATIENT)
Age: 49
End: 2024-12-03

## 2024-12-03 ENCOUNTER — APPOINTMENT (OUTPATIENT)
Dept: LAB | Facility: CLINIC | Age: 49
End: 2024-12-03
Payer: COMMERCIAL

## 2024-12-03 ENCOUNTER — OFFICE VISIT (OUTPATIENT)
Dept: OBGYN CLINIC | Facility: CLINIC | Age: 49
End: 2024-12-03
Payer: COMMERCIAL

## 2024-12-03 VITALS
SYSTOLIC BLOOD PRESSURE: 118 MMHG | BODY MASS INDEX: 31.04 KG/M2 | HEART RATE: 86 BPM | WEIGHT: 209.6 LBS | HEIGHT: 69 IN | DIASTOLIC BLOOD PRESSURE: 70 MMHG | OXYGEN SATURATION: 100 %

## 2024-12-03 DIAGNOSIS — N95.0 PMB (POSTMENOPAUSAL BLEEDING): Primary | ICD-10-CM

## 2024-12-03 DIAGNOSIS — N95.0 PMB (POSTMENOPAUSAL BLEEDING): ICD-10-CM

## 2024-12-03 PROBLEM — F32.A DEPRESSION: Status: ACTIVE | Noted: 2017-01-09

## 2024-12-03 PROBLEM — F41.9 ANXIETY: Status: ACTIVE | Noted: 2017-01-09

## 2024-12-03 LAB
ESTRADIOL SERPL-MCNC: 26.3 PG/ML
LH SERPL-ACNC: 36.6 MIU/ML
PROLACTIN SERPL-MCNC: 5.48 NG/ML (ref 3.34–26.72)
TSH SERPL DL<=0.05 MIU/L-ACNC: 3.06 UIU/ML (ref 0.45–4.5)

## 2024-12-03 PROCEDURE — 83002 ASSAY OF GONADOTROPIN (LH): CPT

## 2024-12-03 PROCEDURE — 83001 ASSAY OF GONADOTROPIN (FSH): CPT

## 2024-12-03 PROCEDURE — 84146 ASSAY OF PROLACTIN: CPT

## 2024-12-03 PROCEDURE — 84443 ASSAY THYROID STIM HORMONE: CPT

## 2024-12-03 PROCEDURE — 99204 OFFICE O/P NEW MOD 45 MIN: CPT | Performed by: OBSTETRICS & GYNECOLOGY

## 2024-12-03 PROCEDURE — 36415 COLL VENOUS BLD VENIPUNCTURE: CPT

## 2024-12-03 PROCEDURE — 82670 ASSAY OF TOTAL ESTRADIOL: CPT

## 2024-12-03 NOTE — PROGRESS NOTES
NEW PATIENT    Patient is a 49 y.o.  with Patient's last menstrual period was 2019 (exact date). who presents requesting evaluation of PMB. Pt reports her LMP was 2 years ago. She reports 2 weeks ago she noticed brown discharge when she was wiping. She also reports she was crampy and felix similar to when she would get a period. She denies any pelvic trauma. She does report high stress levels as her mother was actively dying at that time. She reports the bloody discharge lasted for 4 days. She never required a pad and she is sure it was from the vagina.     Past Medical History:   Diagnosis Date    Chickenpox     Oral herpes     Pap smear for cervical cancer screening     denies h.o abnormal; 2018 wnl per pt       Past Surgical History:   Procedure Laterality Date    KNEE CARTILAGE SURGERY Left     Arthroscopic    WISDOM TOOTH EXTRACTION         OB History    Para Term  AB Living   1 1 1   1   SAB IAB Ectopic Multiple Live Births       1      # Outcome Date GA Lbr Osmar/2nd Weight Sex Type Anes PTL Lv   1 Term 10/27/00   3629 g (8 lb) M Vag-Spont EPI  QUAN      Obstetric Comments   Menarche: 13      Menopause: 47             Current Outpatient Medications:     dicyclomine (BENTYL) 20 mg tablet, Take 1 tablet (20 mg total) by mouth 2 (two) times a day, Disp: 180 tablet, Rfl: 1    escitalopram (LEXAPRO) 10 mg tablet, Take 1 tablet (10 mg total) by mouth daily, Disp: 90 tablet, Rfl: 1    ibuprofen (MOTRIN) 600 mg tablet, Take 1 tablet (600 mg total) by mouth every 6 (six) hours as needed for moderate pain, Disp: 30 tablet, Rfl: 3    ibuprofen (MOTRIN) 600 mg tablet, Take 600 mg by mouth every 6 (six) hours (Patient not taking: Reported on 12/3/2024), Disp: , Rfl:     tirzepatide (Zepbound) 2.5 mg/0.5 mL auto-injector, Inject 0.5 mL (2.5 mg total) under the skin once a week for 28 days (Patient not taking: Reported on 12/3/2024), Disp: 2 mL, Rfl: 0    No Known Allergies    Social History      Socioeconomic History    Marital status: /Civil Union     Spouse name: Td    Number of children: 1    Years of education: None    Highest education level: High school graduate   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   Tobacco Use    Smoking status: Every Day     Current packs/day: 1.00     Types: Cigarettes    Smokeless tobacco: Current    Tobacco comments:     1 ppd   Vaping Use    Vaping status: Never Used   Substance and Sexual Activity    Alcohol use: Yes     Alcohol/week: 4.0 - 5.0 standard drinks of alcohol     Types: 4 - 5 Standard drinks or equivalent per week     Comment: vodka    Drug use: No    Sexual activity: Yes     Partners: Male     Birth control/protection: Post-menopausal     Comment: lifetime partners: 10, : 2015 (together since )   Other Topics Concern    None   Social History Narrative    P[employer]ec[employer]al: no preference    Accepts blood products     Social Drivers of Health     Financial Resource Strain: Not on file   Food Insecurity: Not on file   Transportation Needs: Not on file   Physical Activity: Not on file   Stress: Not on file   Social Connections: Not on file   Intimate Partner Violence: Not on file   Housing Stability: Not on file       Family History   Problem Relation Age of Onset    Lung cancer Mother 64        smoker,  65    Hypertension Father     No Known Problems Brother     No Known Problems Brother     Diabetes type II Maternal Grandmother     No Known Problems Maternal Grandfather     Diabetes type II Paternal Grandmother     No Known Problems Paternal Grandfather     Breast cancer Neg Hx     Ovarian cancer Neg Hx     Colon cancer Neg Hx        Review of Systems   Constitutional:  Negative for chills, fatigue, fever and unexpected weight change.   HENT:  Negative for congestion, mouth sores and sore throat.    Respiratory:  Negative for cough, chest tightness, shortness of breath and wheezing.    Cardiovascular:  Negative for chest pain  "and palpitations.   Gastrointestinal:  Negative for abdominal distention, abdominal pain, constipation, diarrhea, nausea and vomiting.   Endocrine: Negative for cold intolerance and heat intolerance.   Genitourinary:  Positive for vaginal bleeding (resolved). Negative for dyspareunia, dysuria, genital sores, menstrual problem, pelvic pain, vaginal discharge and vaginal pain.   Musculoskeletal:  Negative for arthralgias.   Skin:  Negative for color change and rash.   Neurological:  Negative for dizziness, light-headedness and headaches.   Hematological:  Negative for adenopathy.       Blood pressure 118/70, pulse 86, height 5' 9\" (1.753 m), weight 95.1 kg (209 lb 9.6 oz), last menstrual period 01/26/2019, SpO2 100%. and Body mass index is 30.95 kg/m².    Physical Exam  Constitutional:       General: She is not in acute distress.     Appearance: Normal appearance. She is well-developed. She is not ill-appearing.   HENT:      Head: Normocephalic and atraumatic.   Eyes:      Extraocular Movements: Extraocular movements intact.      Conjunctiva/sclera: Conjunctivae normal.   Neck:      Thyroid: No thyromegaly.      Trachea: No tracheal deviation.   Cardiovascular:      Rate and Rhythm: Normal rate and regular rhythm.      Heart sounds: Normal heart sounds.   Pulmonary:      Effort: Pulmonary effort is normal. No respiratory distress.      Breath sounds: Normal breath sounds. No stridor. No wheezing or rales.   Abdominal:      General: Bowel sounds are normal. There is no distension.      Palpations: Abdomen is soft. There is no mass.      Tenderness: There is no abdominal tenderness. There is no guarding or rebound.      Hernia: No hernia is present.   Musculoskeletal:         General: No tenderness. Normal range of motion.      Cervical back: Normal range of motion and neck supple.   Lymphadenopathy:      Cervical: No cervical adenopathy.   Skin:     General: Skin is warm.      Findings: No erythema or rash. "   Neurological:      Mental Status: She is alert and oriented to person, place, and time.   Psychiatric:         Mood and Affect: Mood normal.         Behavior: Behavior normal.         Thought Content: Thought content normal.         Judgment: Judgment normal.      Comments: Appropriately anxious          vulva: normal external genitalia for age and no lesions, masses, epithelial changes, or exudate  vagina: color pink and rugae  well formed rugae  cervix: parous and nabothian cyst at 10'oclock with blood vessels coursing over it  uterus: NSSC, AF, NT, mobile  adnexa: no masses or tenderness  No blood noted on examination    A/P:  Pt is a 49 y.o.  with      Sherrill was seen today for vaginal bleeding.    Diagnoses and all orders for this visit:    PMB (postmenopausal bleeding)  -     US pelvis complete w transvaginal; Future  -     Follicle stimulating hormone; Future  -     Prolactin; Future  -     TSH, 3rd generation with Free T4 reflex; Future  -     Luteinizing hormone; Future  -     Estradiol; Future      Will check hormone levels as pt reports no family h.o of menopause prior to 50 to ensure menopause and will also check pelvic u/s. Recommend Ebx if ET 4 or more mm and will schedule for now.

## 2024-12-03 NOTE — TELEPHONE ENCOUNTER
Patient has [employer] insurance. Due to their new guidelines as of 10/1/24, they will only cover zepbound (tirzepatide) if the starting BMI is/was over 40.   This patient's BMI does not meet this requirement so it will be denied.     Do you have any other suggestions?

## 2024-12-06 ENCOUNTER — HOSPITAL ENCOUNTER (OUTPATIENT)
Dept: ULTRASOUND IMAGING | Facility: HOSPITAL | Age: 49
End: 2024-12-06
Payer: COMMERCIAL

## 2024-12-06 DIAGNOSIS — N95.0 PMB (POSTMENOPAUSAL BLEEDING): ICD-10-CM

## 2024-12-06 LAB — FSH SERPL-ACNC: 84.4 MIU/ML

## 2024-12-06 PROCEDURE — 76856 US EXAM PELVIC COMPLETE: CPT

## 2024-12-06 PROCEDURE — 76830 TRANSVAGINAL US NON-OB: CPT

## 2024-12-11 ENCOUNTER — RESULTS FOLLOW-UP (OUTPATIENT)
Dept: OBGYN CLINIC | Facility: CLINIC | Age: 49
End: 2024-12-11

## 2024-12-11 NOTE — TELEPHONE ENCOUNTER
Pt returned call. Reviewed results and that Dr. Hodges is awaiting US results. Pt verbalized understanding and is thankful.

## 2024-12-13 NOTE — TELEPHONE ENCOUNTER
I called the patient to review her ultrasound results.    We reviewed that her fibroids are stable in size, but her endometrium is 5 mm. With PMB and ET >4 mm, I recommend we proceed with the Ebx on 12/17 as scheduled. Pt is agreeable. All questions answered.

## 2024-12-17 ENCOUNTER — PROCEDURE VISIT (OUTPATIENT)
Dept: OBGYN CLINIC | Facility: CLINIC | Age: 49
End: 2024-12-17
Payer: COMMERCIAL

## 2024-12-17 VITALS
WEIGHT: 206.6 LBS | OXYGEN SATURATION: 96 % | HEART RATE: 88 BPM | DIASTOLIC BLOOD PRESSURE: 74 MMHG | BODY MASS INDEX: 30.6 KG/M2 | HEIGHT: 69 IN | SYSTOLIC BLOOD PRESSURE: 114 MMHG

## 2024-12-17 DIAGNOSIS — N95.0 PMB (POSTMENOPAUSAL BLEEDING): Primary | ICD-10-CM

## 2024-12-17 PROCEDURE — 58100 BIOPSY OF UTERUS LINING: CPT | Performed by: OBSTETRICS & GYNECOLOGY

## 2024-12-17 PROCEDURE — 88305 TISSUE EXAM BY PATHOLOGIST: CPT | Performed by: PATHOLOGY

## 2024-12-17 NOTE — PROGRESS NOTES
"Endometrial biopsy    Date/Time: 12/17/2024 9:00 AM    Performed by: Alla Hodges MD  Authorized by: Alla Hodges MD  [place] Protocol:  procedure performed by consultantConsent: Verbal consent obtained. Written consent obtained.  Risks and benefits: risks, benefits and alternatives were discussed  Consent given by: patient  Time out: Immediately prior to procedure a \"time out\" was called to verify the correct patient, procedure, equipment, support staff and site/side marked as required.  Patient understanding: patient states understanding of the procedure being performed  Patient consent: the patient's understanding of the procedure matches consent given  Procedure consent: procedure consent matches procedure scheduled  Relevant documents: relevant documents present and verified  Test results: test results available and properly labeled  Radiology Images displayed and confirmed. If images not available, report reviewed: imaging studies available  Required items: required blood products, implants, devices, and special equipment available  Patient identity confirmed: verbally with patient    Indication:     Indications: Post-menopausal bleeding      Chronicity of post-menopausal bleeding:  New    Progression of post-menopausal bleeding:  Resolved  Procedure:     Procedure: endometrial biopsy with Pip[place]      A bivalve speculum was placed in the vagina: yes      Cervix cleaned and prepped: yes      The cervix was dilated: no      Uterus sounded: yes      Uterus sound depth (cm):  8    Specimen collected: specimen collected and sent to pathology      Patient tolerated procedure well with no complications: yes    Findings:     Uterus size:  Non-gravid    Cervix: normal    Comments:     Procedure comments:  Post procedure instructions reviewed.      "

## 2024-12-23 DIAGNOSIS — K21.00 GASTROESOPHAGEAL REFLUX DISEASE WITH ESOPHAGITIS WITHOUT HEMORRHAGE: Primary | ICD-10-CM

## 2024-12-23 PROCEDURE — 88305 TISSUE EXAM BY PATHOLOGIST: CPT | Performed by: PATHOLOGY

## 2024-12-23 NOTE — TELEPHONE ENCOUNTER
Patient is requesting Omeprazole. Please review and advise. Patient uses [id number] - [place] - [address] [phone]

## 2024-12-24 ENCOUNTER — RESULTS FOLLOW-UP (OUTPATIENT)
Dept: OBGYN CLINIC | Facility: CLINIC | Age: 49
End: 2024-12-24

## 2024-12-24 NOTE — TELEPHONE ENCOUNTER
Called patient to review pathology results and discuss management    Please let me know if she calls back. LMOM reassuring her that findings are benign, but I need to discuss further with her

## 2024-12-29 PROBLEM — Z00.00 WELL ADULT EXAM: Status: RESOLVED | Noted: 2024-11-29 | Resolved: 2024-12-29

## 2025-01-07 ENCOUNTER — VBI (OUTPATIENT)
Dept: ADMINISTRATIVE | Facility: OTHER | Age: 50
End: 2025-01-07

## 2025-01-07 NOTE — TELEPHONE ENCOUNTER
01/07/25 4:17 AM     Chart reviewed for   Cervical Cancer Screening    ; nothing is submitted to the patient's insurance at this time.     LULÚ ROMO MA   [facility]

## 2025-02-04 ENCOUNTER — CONSULT (OUTPATIENT)
Dept: OBGYN CLINIC | Facility: CLINIC | Age: 50
End: 2025-02-04
Payer: COMMERCIAL

## 2025-02-04 VITALS
BODY MASS INDEX: 31.4 KG/M2 | HEIGHT: 69 IN | OXYGEN SATURATION: 97 % | SYSTOLIC BLOOD PRESSURE: 120 MMHG | WEIGHT: 212 LBS | HEART RATE: 96 BPM | DIASTOLIC BLOOD PRESSURE: 72 MMHG

## 2025-02-04 DIAGNOSIS — N84.0 ENDOMETRIAL POLYP: Primary | ICD-10-CM

## 2025-02-04 PROCEDURE — 99213 OFFICE O/P EST LOW 20 MIN: CPT | Performed by: OBSTETRICS & GYNECOLOGY

## 2025-02-04 NOTE — PATIENT INSTRUCTIONS
Consider the following options  Observation  Saline Infusion Hysterogram (SIS) to see if you have any remaining polyps. This is when an ultrasound is being performed at the same time fluid is being put into your uterus  Surgery--Dilate your cervix, put a camera into the uterus and if you have polyps remove them

## 2025-02-04 NOTE — PROGRESS NOTES
Patient is a 49 y.o.  with Patient's last menstrual period was 2019 (exact date). who presents to discuss her recent Ebx which was performed secondary to PMB.    Reviewed with patient that her pathology is benign, but there are fragments of an endometrial polyp.    We reviewed that as an endometrial biopsy is performed without direct visualization, it is impossible for me to know if I removed the whole polyp or if there are portions of a polyp eft behind or even more than one polyp. We reviewed the options of:   Observation--if recurrent bleeding occurs, plan D&C, hysteroscopy  SIS to evaluate for further polyps  D&C, hysteroscopy now to evaluate for further polyps    We reviewed SIS is diagnostic and not therapeutic and that if she has no polyps she would not need surgery    Pt would like to consider her options and will get back to me with her final decision.         Past Medical History:   Diagnosis Date    Chickenpox     Oral herpes     Pap smear for cervical cancer screening     denies h.o abnormal; 2018 wnl per pt       Past Surgical History:   Procedure Laterality Date    KNEE CARTILAGE SURGERY Left     Arthroscopic    WISDOM TOOTH EXTRACTION         OB History    Para Term  AB Living   1 1 1   1   SAB IAB Ectopic Multiple Live Births       1      # Outcome Date GA Lbr Osmar/2nd Weight Sex Type Anes PTL Lv   1 Term 10/27/00   3629 g (8 lb) M Vag-Spont EPI  QUAN      Obstetric Comments   Menarche: 13      Menopause: 47         Current Outpatient Medications:     dicyclomine (BENTYL) 20 mg tablet, Take 1 tablet (20 mg total) by mouth 2 (two) times a day, Disp: 180 tablet, Rfl: 1    escitalopram (LEXAPRO) 10 mg tablet, Take 1 tablet (10 mg total) by mouth daily, Disp: 90 tablet, Rfl: 1    ibuprofen (MOTRIN) 600 mg tablet, Take 1 tablet (600 mg total) by mouth every 6 (six) hours as needed for moderate pain, Disp: 30 tablet, Rfl: 3    omeprazole (PriLOSEC) 20 mg delayed release capsule,  Take 1 capsule (20 mg total) by mouth daily before breakfast, Disp: 90 capsule, Rfl: 1    ibuprofen (MOTRIN) 600 mg tablet, Take 600 mg by mouth every 6 (six) hours (Patient not taking: Reported on 12/3/2024), Disp: , Rfl:     No Known Allergies    Social History     Socioeconomic History    Marital status: /Civil Union     Spouse name: Td    Number of children: 1    Years of education: None    Highest education level: High school graduate   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   Tobacco Use    Smoking status: Every Day     Current packs/day: 1.00     Types: Cigarettes    Smokeless tobacco: Current    Tobacco comments:     1 ppd   Vaping Use    Vaping status: Never Used   Substance and Sexual Activity    Alcohol use: Yes     Alcohol/week: 4.0 - 5.0 standard drinks of alcohol     Types: 4 - 5 Standard drinks or equivalent per week     Comment: vodka    Drug use: No    Sexual activity: Yes     Partners: Male     Birth control/protection: Post-menopausal     Comment: lifetime partners: 10, : 2015 (together since )   Other Topics Concern    None   Social History Narrative    Presybeterian: no preference    Accepts blood products     Social Drivers of Health     Financial Resource Strain: Not on file   Food Insecurity: Not on file   Transportation Needs: Not on file   Physical Activity: Not on file   Stress: Not on file   Social Connections: Not on file   Intimate Partner Violence: Not on file   Housing Stability: Not on file       Family History   Problem Relation Age of Onset    Lung cancer Mother 64        smoker,  65    Hypertension Father     No Known Problems Brother     No Known Problems Brother     Diabetes type II Maternal Grandmother     No Known Problems Maternal Grandfather     Diabetes type II Paternal Grandmother     No Known Problems Paternal Grandfather     Breast cancer Neg Hx     Ovarian cancer Neg Hx     Colon cancer Neg Hx        Review of Systems  "  Constitutional:  Negative for chills, fatigue, fever and unexpected weight change.   HENT:  Negative for congestion, mouth sores and sore throat.    Respiratory:  Negative for cough, chest tightness, shortness of breath and wheezing.    Cardiovascular:  Negative for chest pain and palpitations.   Gastrointestinal:  Negative for abdominal distention, abdominal pain, constipation, diarrhea, nausea and vomiting.   Endocrine: Negative for cold intolerance and heat intolerance.   Genitourinary:  Negative for dyspareunia, dysuria, genital sores, menstrual problem, pelvic pain, vaginal bleeding, vaginal discharge and vaginal pain.   Musculoskeletal:  Negative for arthralgias.   Skin:  Negative for color change and rash.   Neurological:  Negative for dizziness, light-headedness and headaches.   Hematological:  Negative for adenopathy.       Blood pressure 120/72, pulse 96, height 5' 9\" (1.753 m), weight 96.2 kg (212 lb), last menstrual period 2019, SpO2 97%. and Body mass index is 31.31 kg/m².    Physical Exam  Constitutional:       General: She is not in acute distress.     Appearance: Normal appearance. She is not ill-appearing.   HENT:      Head: Normocephalic and atraumatic.   Eyes:      Extraocular Movements: Extraocular movements intact.      Conjunctiva/sclera: Conjunctivae normal.   Pulmonary:      Effort: Pulmonary effort is normal.   Musculoskeletal:         General: Normal range of motion.      Cervical back: Normal range of motion.   Skin:     General: Skin is warm.      Findings: No erythema or rash.   Neurological:      Mental Status: She is alert and oriented to person, place, and time.   Psychiatric:         Mood and Affect: Mood normal.         Behavior: Behavior normal.         Thought Content: Thought content normal.         Judgment: Judgment normal.               A/P:  Pt is a 49 y.o.  with      Sherrill was seen today for results.    Diagnoses and all orders for this visit:    Endometrial " polyp    Noted on pathology of ebx  Pt undecided at this time regarding management as she has had no further bleeding.  Considering observation vs SIS vs D&C, hysteroscopy  Will let me know her decision

## 2025-04-08 ENCOUNTER — ANNUAL EXAM (OUTPATIENT)
Dept: OBGYN CLINIC | Facility: CLINIC | Age: 50
End: 2025-04-08

## 2025-04-08 VITALS
WEIGHT: 211 LBS | DIASTOLIC BLOOD PRESSURE: 84 MMHG | BODY MASS INDEX: 31.25 KG/M2 | SYSTOLIC BLOOD PRESSURE: 122 MMHG | HEIGHT: 69 IN

## 2025-04-08 DIAGNOSIS — Z12.4 PAP SMEAR FOR CERVICAL CANCER SCREENING: ICD-10-CM

## 2025-04-08 DIAGNOSIS — Z01.419 ENCOUNTER FOR ANNUAL ROUTINE GYNECOLOGICAL EXAMINATION: Primary | ICD-10-CM

## 2025-04-08 DIAGNOSIS — E58 DIETARY CALCIUM DEFICIENCY: ICD-10-CM

## 2025-04-08 DIAGNOSIS — N84.0 ENDOMETRIAL POLYP: ICD-10-CM

## 2025-04-08 DIAGNOSIS — Z12.31 VISIT FOR SCREENING MAMMOGRAM: ICD-10-CM

## 2025-04-08 DIAGNOSIS — Z72.3 INADEQUATE EXERCISE: ICD-10-CM

## 2025-04-08 PROBLEM — R10.11 RIGHT UPPER QUADRANT PAIN: Status: RESOLVED | Noted: 2023-09-19 | Resolved: 2025-04-08

## 2025-04-08 PROBLEM — K56.609 BOWEL OBSTRUCTION (HCC): Status: RESOLVED | Noted: 2024-01-09 | Resolved: 2025-04-08

## 2025-04-08 PROCEDURE — G0476 HPV COMBO ASSAY CA SCREEN: HCPCS | Performed by: OBSTETRICS & GYNECOLOGY

## 2025-04-08 PROCEDURE — G0145 SCR C/V CYTO,THINLAYER,RESCR: HCPCS | Performed by: OBSTETRICS & GYNECOLOGY

## 2025-04-08 NOTE — PROGRESS NOTES
Pt is a50 y.o.  ,menopausal, who presents for preventive care  Partner same ; lifetime  >5 partners         safe sexual practices followed: yes     does feel safe in relationship:yes    Dairy: 1 cheese daily  Vitamin D: none  Exercise: none  Pap: 2018-wnl, HRHPV neg  Mammogram: 2023-wnl  Colonoscopy: 2021-polyps, repeat 7 years  DEXA: not indicated  safety at home:  yes  tobacco use Yes.  she does not wish to begin/beginning the process of cessation    Pt reports she is still considering whether or not she will proceed with D&C, hysteroscopy, polypectomy or observe with regards to her PMB and endometrial polyp    Past Medical History:   Diagnosis Date    Chickenpox     Oral herpes     Pap smear for cervical cancer screening     denies h.o abnormal;  wnl per pt; 2025       Past Surgical History:   Procedure Laterality Date    KNEE CARTILAGE SURGERY Left     Arthroscopic    WISDOM TOOTH EXTRACTION         Ob Hx:   OB History    Para Term  AB Living   1 1 1   1   SAB IAB Ectopic Multiple Live Births       1      # Outcome Date GA Lbr Osmar/2nd Weight Sex Type Anes PTL Lv   1 Term 10/27/00   3629 g (8 lb) M Vag-Spont EPI  QUAN      Obstetric Comments   Menarche: 13      Menopause: 47           Current Outpatient Medications:     dicyclomine (BENTYL) 20 mg tablet, Take 1 tablet (20 mg total) by mouth 2 (two) times a day (Patient not taking: Reported on 2025), Disp: 180 tablet, Rfl: 1    escitalopram (LEXAPRO) 10 mg tablet, Take 1 tablet (10 mg total) by mouth daily (Patient not taking: Reported on 2025), Disp: 90 tablet, Rfl: 1    ibuprofen (MOTRIN) 600 mg tablet, Take 600 mg by mouth every 6 (six) hours (Patient not taking: Reported on 12/3/2024), Disp: , Rfl:     ibuprofen (MOTRIN) 600 mg tablet, Take 1 tablet (600 mg total) by mouth every 6 (six) hours as needed for moderate pain (Patient not taking: Reported on 2025), Disp: 30 tablet, Rfl: 3    omeprazole (PriLOSEC) 20  mg delayed release capsule, Take 1 capsule (20 mg total) by mouth daily before breakfast (Patient not taking: Reported on 2025), Disp: 90 capsule, Rfl: 1    No Known Allergies    Social History     Socioeconomic History    Marital status: /Civil Union     Spouse name: Td    Number of children: 1    Years of education: None    Highest education level: High school graduate   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   Tobacco Use    Smoking status: Every Day     Current packs/day: 1.00     Types: Cigarettes    Smokeless tobacco: Current    Tobacco comments:     1 ppd   Vaping Use    Vaping status: Never Used   Substance and Sexual Activity    Alcohol use: Yes     Alcohol/week: 4.0 - 5.0 standard drinks of alcohol     Types: 4 - 5 Standard drinks or equivalent per week     Comment: vodka    Drug use: No    Sexual activity: Yes     Partners: Male     Birth control/protection: Post-menopausal     Comment: lifetime partners: 10, : 2015 (together since )   Other Topics Concern    None   Social History Narrative    Rastafari: no preference    Accepts blood products        Exercise: none    Calcium: 1 cheese daily     Social Drivers of Health     Financial Resource Strain: Not on file   Food Insecurity: Not on file   Transportation Needs: Not on file   Physical Activity: Not on file   Stress: Not on file   Social Connections: Not on file   Intimate Partner Violence: Not on file   Housing Stability: Not on file       Family History   Problem Relation Age of Onset    Lung cancer Mother 64        smoker,  65    Hypertension Father     No Known Problems Brother     No Known Problems Brother     Diabetes type II Maternal Grandmother     No Known Problems Maternal Grandfather     Diabetes type II Paternal Grandmother     No Known Problems Paternal Grandfather     Breast cancer Neg Hx     Ovarian cancer Neg Hx     Colon cancer Neg Hx        Blood pressure 122/84,  "height 5' 9\" (1.753 m), weight 95.7 kg (211 lb), last menstrual period 01/26/2019. and Body mass index is 31.16 kg/m².    Physical Exam  Constitutional:       General: She is not in acute distress.     Appearance: Normal appearance. She is well-developed. She is obese. She is not ill-appearing.   HENT:      Head: Normocephalic and atraumatic.   Eyes:      Extraocular Movements: Extraocular movements intact.      Conjunctiva/sclera: Conjunctivae normal.   Neck:      Thyroid: No thyromegaly.      Trachea: No tracheal deviation.   Cardiovascular:      Rate and Rhythm: Normal rate and regular rhythm.      Heart sounds: Normal heart sounds.   Pulmonary:      Effort: Pulmonary effort is normal. No respiratory distress.      Breath sounds: Normal breath sounds. No stridor. No wheezing or rales.   Abdominal:      General: Bowel sounds are normal. There is no distension.      Palpations: Abdomen is soft. There is no mass.      Tenderness: There is no abdominal tenderness. There is no guarding or rebound.      Hernia: No hernia is present.   Musculoskeletal:         General: No tenderness. Normal range of motion.      Cervical back: Normal range of motion and neck supple.      Right lower leg: No edema.      Left lower leg: No edema.   Lymphadenopathy:      Cervical: No cervical adenopathy.   Skin:     General: Skin is warm.      Findings: No erythema or rash.   Neurological:      Mental Status: She is alert and oriented to person, place, and time.   Psychiatric:         Mood and Affect: Mood normal.         Behavior: Behavior normal.         Thought Content: Thought content normal.         Judgment: Judgment normal.          Breasts: breasts appear normal, no suspicious masses, no skin or nipple changes or axillary nodes, symmetric fibrous changes in both upper outer quadrants.    vulva: normal external genitalia for age and no lesions, masses, epithelial changes, or exudate  vagina: color pink and rugae  flattening of " rugae  cervix: parous, no lesions , and pap obtained  uterus: NSSC, AF, NT, mobile  adnexa: no masses or tenderness  rectum: no masses or nodularity    A/P:  Pt is a 50 y.o.  with       Sherrill was seen today for annual exam.    Diagnoses and all orders for this visit:    Encounter for annual routine gynecological examination  -stable examination  -pap updated today  -colonoscopy up to date    Pap smear for cervical cancer screening  -     Liquid-based pap, screening    Visit for screening mammogram  -     Mammo screening bilateral w 3d and cad; Future    Endometrial polyp  -pt has decided to observe for now and if she has another episode of PMB will contact me    Dietary calcium deficiency  Patient advised recommendation of daily dietary calcium of 1200 mg calcium.     Inadequate exercise  Patient advised recommendation of exercise 5 times per week for 30 minutes.    BMI 31.0-31.9,adult  Patient advised recommendation of BMI to be between 19-25.

## 2025-04-09 LAB
HPV HR 12 DNA CVX QL NAA+PROBE: NEGATIVE
HPV16 DNA CVX QL NAA+PROBE: NEGATIVE
HPV18 DNA CVX QL NAA+PROBE: NEGATIVE

## 2025-04-10 ENCOUNTER — OFFICE VISIT (OUTPATIENT)
Age: 50
End: 2025-04-10
Payer: COMMERCIAL

## 2025-04-10 VITALS
HEIGHT: 69 IN | SYSTOLIC BLOOD PRESSURE: 138 MMHG | DIASTOLIC BLOOD PRESSURE: 88 MMHG | HEART RATE: 98 BPM | WEIGHT: 211 LBS | TEMPERATURE: 98 F | OXYGEN SATURATION: 99 % | BODY MASS INDEX: 31.25 KG/M2

## 2025-04-10 DIAGNOSIS — R91.1 PULMONARY NODULE: Primary | ICD-10-CM

## 2025-04-10 PROCEDURE — 99213 OFFICE O/P EST LOW 20 MIN: CPT | Performed by: FAMILY MEDICINE

## 2025-04-10 NOTE — ASSESSMENT & PLAN NOTE
Guidance given.  Previous CAT scans reviewed from 2023.  Patient to go for CAT scan of the chest with history of smoking.  Orders:    CT chest wo contrast; Future

## 2025-04-10 NOTE — PROGRESS NOTES
"Name: Sherrill Person      : 1975      MRN: [id number]  Encounter Provider: Surinder Roe DO  Encounter Date: 4/10/2025   Encounter department: [facility]IMARY [facility]  :  Assessment & Plan  Pulmonary nodule  Guidance given.  Previous CAT scans reviewed from .  Patient to go for CAT scan of the chest with history of smoking.  Orders:    CT chest wo contrast; Future           History of Present Illness   Patient is here to follow-up on pulmonary nodules.  No chest pain or shortness of breath or hemoptysis or wheezing.  Patient wishes to have CAT scan repeated.      Review of Systems   Constitutional: Negative.    HENT: Negative.     Eyes: Negative.    Respiratory: Negative.     Cardiovascular: Negative.    Gastrointestinal: Negative.    Endocrine: Negative.    Genitourinary: Negative.    Musculoskeletal: Negative.    Skin: Negative.    Allergic/Immunologic: Negative.    Neurological: Negative.    Hematological: Negative.    Psychiatric/Behavioral: Negative.         Objective   /88 (BP Location: Left arm, Patient Position: Standing, Cuff Size: Standard)   Pulse 98   Temp 98 °F (36.7 °C) (Temporal)   Ht 5' 9\" (1.753 m)   Wt 95.7 kg (211 lb)   LMP 2019 (Exact Date)   SpO2 99%   BMI 31.16 kg/m²      Physical Exam  Vitals and nursing note reviewed.   Constitutional:       General: She is not in acute distress.     Appearance: Normal appearance. She is not ill-appearing, toxic-appearing or diaphoretic.   Cardiovascular:      Rate and Rhythm: Normal rate and regular rhythm.      Pulses: Normal pulses.      Heart sounds: Normal heart sounds.   Pulmonary:      Effort: Pulmonary effort is normal.      Breath sounds: Normal breath sounds.   Neurological:      Mental Status: She is alert.         "

## 2025-04-11 ENCOUNTER — RESULTS FOLLOW-UP (OUTPATIENT)
Dept: OBGYN CLINIC | Facility: CLINIC | Age: 50
End: 2025-04-11

## 2025-04-11 LAB
LAB AP GYN PRIMARY INTERPRETATION: NORMAL
Lab: NORMAL

## 2025-05-08 PROBLEM — Z01.419 ENCOUNTER FOR ANNUAL ROUTINE GYNECOLOGICAL EXAMINATION: Status: RESOLVED | Noted: 2025-04-08 | Resolved: 2025-05-08

## 2025-06-02 ENCOUNTER — HOSPITAL ENCOUNTER (OUTPATIENT)
Dept: MAMMOGRAPHY | Facility: MEDICAL CENTER | Age: 50
Discharge: HOME | End: 2025-06-02
Payer: COMMERCIAL

## 2025-06-02 VITALS — WEIGHT: 211 LBS | HEIGHT: 69 IN | BODY MASS INDEX: 31.25 KG/M2

## 2025-06-02 DIAGNOSIS — Z12.31 VISIT FOR SCREENING MAMMOGRAM: ICD-10-CM

## 2025-06-02 PROCEDURE — 77063 BREAST TOMOSYNTHESIS BI: CPT

## 2025-06-02 PROCEDURE — 77067 SCR MAMMO BI INCL CAD: CPT

## 2025-06-17 DIAGNOSIS — M54.16 LUMBAR RADICULITIS: ICD-10-CM

## 2025-06-17 DIAGNOSIS — M54.14 THORACIC RADICULITIS: Primary | ICD-10-CM

## 2025-06-18 RX ORDER — IBUPROFEN 600 MG/1
600 TABLET, FILM COATED ORAL EVERY 6 HOURS
Qty: 30 TABLET | Refills: 0 | Status: SHIPPED | OUTPATIENT
Start: 2025-06-18

## 2025-06-20 ENCOUNTER — TELEPHONE (OUTPATIENT)
Age: 50
End: 2025-06-20

## 2025-06-20 DIAGNOSIS — Z72.0 TOBACCO ABUSE: Primary | ICD-10-CM

## 2025-06-20 RX ORDER — NICOTINE 21 MG/24HR
1 PATCH, TRANSDERMAL 24 HOURS TRANSDERMAL EVERY 24 HOURS
Qty: 28 PATCH | Refills: 0 | Status: SHIPPED | OUTPATIENT
Start: 2025-06-20

## 2025-06-20 NOTE — TELEPHONE ENCOUNTER
Patient called the RX Refill Line. Message is being forwarded to the office.     Patient is requesting a new script for nicoderm patches.    Millie Fortune     Please contact patient at [phone]

## 2025-06-21 ENCOUNTER — HOSPITAL ENCOUNTER (OUTPATIENT)
Dept: CT IMAGING | Facility: HOSPITAL | Age: 50
Discharge: HOME | End: 2025-06-21
Payer: COMMERCIAL

## 2025-06-21 DIAGNOSIS — R91.1 PULMONARY NODULE: ICD-10-CM

## 2025-06-21 PROCEDURE — 71250 CT THORAX DX C-: CPT

## 2025-06-26 ENCOUNTER — TELEPHONE (OUTPATIENT)
Age: 50
End: 2025-06-26

## 2025-06-26 DIAGNOSIS — Z72.0 TOBACCO ABUSE: Primary | ICD-10-CM

## 2025-06-26 NOTE — TELEPHONE ENCOUNTER
Patient called the RX Refill Line. Message is being forwarded to the office.     Patient is requesting the dr to prescribe wellburtin so that she can take it along with the patches to help her quit smoking     Asked that it get sent to the [facility] on file     Please contact patient at

## 2025-06-29 ENCOUNTER — RESULTS FOLLOW-UP (OUTPATIENT)
Age: 50
End: 2025-06-29

## 2025-06-30 NOTE — RESULT ENCOUNTER NOTE
Called pt, left  for pt to call us back in regard to her CT chest results. [employer] message sent to pt with findings.    Finding:   CT of the chest shows stable pulmonary nodules. Patient does have diffuse hepatic steatosis/fatty liver disease. Recommend exercise, low-fat diet to try to help with this.

## 2025-06-30 NOTE — TELEPHONE ENCOUNTER
Dr. Roe Pt called on 06.26.25 with the following message :     Patient called the RX Refill Line. Message is being forwarded to the office.      Patient is requesting the dr to prescribe wellburtin so that she can take it along with the patches to help her quit smoking      Asked that it get sent to the [facility] on file      Please contact patient at         As per pt she stated she spoke to you about this matter on her last visit. Please advise. Thank you

## 2025-06-30 NOTE — TELEPHONE ENCOUNTER
Patient calling for update on Primary Care Provider prescribing Wellbutrin to help her quit smoking. Relayed message from another provider about scheduling appointment with Primary Care Provider. Patient states she discussed this medication at her last appointment on 4/10/25, but she was undecided at the time. I spoke with office clinical who says that Primary Care Provider will review and they will reach out to her. Telephone number is [phone].

## 2025-07-01 RX ORDER — BUPROPION HYDROCHLORIDE 150 MG/1
150 TABLET ORAL DAILY
Qty: 90 TABLET | Refills: 0 | Status: SHIPPED | OUTPATIENT
Start: 2025-07-01

## 2025-07-23 DIAGNOSIS — Z72.0 TOBACCO ABUSE: ICD-10-CM

## 2025-07-25 RX ORDER — NICOTINE 21 MG/24HR
1 PATCH, TRANSDERMAL 24 HOURS TRANSDERMAL EVERY 24 HOURS
Qty: 28 PATCH | Refills: 0 | Status: SHIPPED | OUTPATIENT
Start: 2025-07-25

## 2025-07-29 NOTE — TELEPHONE ENCOUNTER
Patient called requesting refill for nicotene patches. Patient made aware medication was refilled on 07/25/2025 for 28 with 0 refills to [facility] pharmacy. Patient instructed to contact the pharmacy and speak with someone directly to obtain refills of medication. Patient advised to call back for refill if their pharmacy is unable to assist them. Patient verbalized understanding.

## 2025-08-12 ENCOUNTER — HOSPITAL ENCOUNTER (OUTPATIENT)
Dept: MAMMOGRAPHY | Facility: CLINIC | Age: 50
Discharge: HOME | End: 2025-08-12
Attending: OBSTETRICS & GYNECOLOGY
Payer: COMMERCIAL

## 2025-08-12 ENCOUNTER — HOSPITAL ENCOUNTER (OUTPATIENT)
Dept: ULTRASOUND IMAGING | Facility: CLINIC | Age: 50
Discharge: HOME | End: 2025-08-12
Attending: OBSTETRICS & GYNECOLOGY
Payer: COMMERCIAL

## 2025-08-20 DIAGNOSIS — K21.00 GASTROESOPHAGEAL REFLUX DISEASE WITH ESOPHAGITIS WITHOUT HEMORRHAGE: ICD-10-CM

## 2025-08-20 DIAGNOSIS — Z72.0 TOBACCO ABUSE: ICD-10-CM

## 2025-08-21 RX ORDER — NICOTINE 21 MG/24HR
1 PATCH, TRANSDERMAL 24 HOURS TRANSDERMAL EVERY 24 HOURS
Qty: 28 PATCH | Refills: 0 | Status: SHIPPED | OUTPATIENT
Start: 2025-08-21

## 2025-08-21 RX ORDER — OMEPRAZOLE 20 MG/1
20 CAPSULE, DELAYED RELEASE ORAL
Qty: 90 CAPSULE | Refills: 1 | Status: SHIPPED | OUTPATIENT
Start: 2025-08-21 | End: 2026-08-16